# Patient Record
Sex: FEMALE | Race: WHITE | Employment: FULL TIME | URBAN - METROPOLITAN AREA
[De-identification: names, ages, dates, MRNs, and addresses within clinical notes are randomized per-mention and may not be internally consistent; named-entity substitution may affect disease eponyms.]

---

## 2017-07-19 ENCOUNTER — OFFICE VISIT (OUTPATIENT)
Dept: ORTHOPEDIC SURGERY | Age: 32
End: 2017-07-19

## 2017-07-19 VITALS — BODY MASS INDEX: 21.82 KG/M2 | HEIGHT: 68 IN | WEIGHT: 143.96 LBS

## 2017-07-19 DIAGNOSIS — M25.522 LEFT ELBOW PAIN: Primary | ICD-10-CM

## 2017-07-19 DIAGNOSIS — M77.02 MEDIAL EPICONDYLITIS, LEFT: ICD-10-CM

## 2017-07-19 PROCEDURE — 73080 X-RAY EXAM OF ELBOW: CPT | Performed by: ORTHOPAEDIC SURGERY

## 2017-07-19 PROCEDURE — 99213 OFFICE O/P EST LOW 20 MIN: CPT | Performed by: ORTHOPAEDIC SURGERY

## 2018-08-30 LAB
C. TRACHOMATIS, EXTERNAL RESULT: NEGATIVE
N. GONORRHOEAE, EXTERNAL RESULT: NEGATIVE

## 2018-09-27 LAB
ABO, EXTERNAL RESULT: NORMAL
HEP B, EXTERNAL RESULT: NEGATIVE
HIV, EXTERNAL RESULT: NEGATIVE
RHOGAM, EXTERNAL RESULT: POSITIVE
RPR, EXTERNAL RESULT: NORMAL
RUBELLA TITER, EXTERNAL RESULT: NORMAL

## 2018-10-26 ENCOUNTER — OFFICE VISIT (OUTPATIENT)
Dept: ORTHOPEDIC SURGERY | Age: 33
End: 2018-10-26
Payer: COMMERCIAL

## 2018-10-26 VITALS
SYSTOLIC BLOOD PRESSURE: 114 MMHG | WEIGHT: 166 LBS | HEART RATE: 108 BPM | HEIGHT: 68 IN | DIASTOLIC BLOOD PRESSURE: 74 MMHG | BODY MASS INDEX: 25.16 KG/M2

## 2018-10-26 DIAGNOSIS — S93.402A SPRAIN OF LEFT ANKLE, UNSPECIFIED LIGAMENT, INITIAL ENCOUNTER: Primary | ICD-10-CM

## 2018-10-26 PROBLEM — Z30.430 ENCOUNTER FOR INSERTION OF INTRAUTERINE CONTRACEPTIVE DEVICE: Status: ACTIVE | Noted: 2018-10-26

## 2018-10-26 PROCEDURE — 99213 OFFICE O/P EST LOW 20 MIN: CPT | Performed by: ORTHOPAEDIC SURGERY

## 2018-10-26 PROCEDURE — L1902 AFO ANKLE GAUNTLET PRE OTS: HCPCS | Performed by: ORTHOPAEDIC SURGERY

## 2018-10-26 NOTE — PROGRESS NOTES
warm and dry. There is no swelling, warmth, or erythema. Range of motion is within normal limits. There is no paraspinal or spinous process tenderness. Ipsilateral and contralateral straight leg raising tests are negative. The distal neurovascular exam is grossly intact and symmetric. Left ankle sprain    Assessment :  Left ankle sprain    Impression:  Encounter Diagnosis   Name Primary?  Sprain of left ankle, unspecified ligament, initial encounter Yes       Office Procedures:  Orders Placed This Encounter   Procedures    Govind Ankle Brace     Patient was prescribed a Govind Ankle Brace. The left ankle will require stabilization / immobilization from this semi-rigid / rigid orthosis to improve their function. The orthosis will assist in protecting the affected area, provide functional support and facilitate healing. Patient was instructed to progress ambulation weight bearing as tolerated in the device. The patient was educated and fit by a healthcare professional with expert knowledge and specialization in brace application while under the direct supervision of the treating physician. Verbal and written instructions for the use of and application of this item were provided. They were instructed to contact the office immediately should the brace result in increased pain, decreased sensation, increased swelling or worsening of the condition. Procedures    Govind Ankle Brace     Patient was prescribed a Govind Ankle Brace. The left ankle will require stabilization / immobilization from this semi-rigid / rigid orthosis to improve their function. The orthosis will assist in protecting the affected area, provide functional support and facilitate healing. Patient was instructed to progress ambulation weight bearing as tolerated in the device.      The patient was educated and fit by a healthcare professional with expert knowledge and specialization in brace application while under the

## 2019-03-26 LAB — GBS, EXTERNAL RESULT: NEGATIVE

## 2019-04-24 ENCOUNTER — HOSPITAL ENCOUNTER (INPATIENT)
Age: 34
LOS: 3 days | Discharge: HOME | End: 2019-04-27
Attending: OBSTETRICS & GYNECOLOGY | Admitting: OBSTETRICS & GYNECOLOGY
Payer: COMMERCIAL

## 2019-04-24 ENCOUNTER — APPOINTMENT (OUTPATIENT)
Dept: LABOR AND DELIVERY | Age: 34
End: 2019-04-24
Payer: COMMERCIAL

## 2019-04-24 LAB
AMPHETAMINE SCREEN, URINE: NORMAL
BARBITURATE SCREEN URINE: NORMAL
BASOPHILS ABSOLUTE: 0 K/UL (ref 0–0.2)
BASOPHILS RELATIVE PERCENT: 0.2 %
BENZODIAZEPINE SCREEN, URINE: NORMAL
BUPRENORPHINE URINE: NORMAL
CANNABINOID SCREEN URINE: NORMAL
COCAINE METABOLITE SCREEN URINE: NORMAL
EOSINOPHILS ABSOLUTE: 0.1 K/UL (ref 0–0.6)
EOSINOPHILS RELATIVE PERCENT: 1 %
HCT VFR BLD CALC: 37.6 % (ref 36–48)
HEMOGLOBIN: 13.3 G/DL (ref 12–16)
LYMPHOCYTES ABSOLUTE: 2.3 K/UL (ref 1–5.1)
LYMPHOCYTES RELATIVE PERCENT: 23.9 %
Lab: NORMAL
MCH RBC QN AUTO: 36 PG (ref 26–34)
MCHC RBC AUTO-ENTMCNC: 35.4 G/DL (ref 31–36)
MCV RBC AUTO: 101.6 FL (ref 80–100)
METHADONE SCREEN, URINE: NORMAL
MONOCYTES ABSOLUTE: 0.4 K/UL (ref 0–1.3)
MONOCYTES RELATIVE PERCENT: 4.4 %
NEUTROPHILS ABSOLUTE: 7 K/UL (ref 1.7–7.7)
NEUTROPHILS RELATIVE PERCENT: 70.5 %
OPIATE SCREEN URINE: NORMAL
OXYCODONE URINE: NORMAL
PDW BLD-RTO: 13.1 % (ref 12.4–15.4)
PH UA: 6.5
PHENCYCLIDINE SCREEN URINE: NORMAL
PLATELET # BLD: 268 K/UL (ref 135–450)
PMV BLD AUTO: 7.5 FL (ref 5–10.5)
PROPOXYPHENE SCREEN: NORMAL
RBC # BLD: 3.69 M/UL (ref 4–5.2)
SPECIMEN STATUS: NORMAL
WBC # BLD: 9.9 K/UL (ref 4–11)

## 2019-04-24 PROCEDURE — 86780 TREPONEMA PALLIDUM: CPT

## 2019-04-24 PROCEDURE — 3E0P7VZ INTRODUCTION OF HORMONE INTO FEMALE REPRODUCTIVE, VIA NATURAL OR ARTIFICIAL OPENING: ICD-10-PCS | Performed by: OBSTETRICS & GYNECOLOGY

## 2019-04-24 PROCEDURE — 2580000003 HC RX 258

## 2019-04-24 PROCEDURE — 1220000000 HC SEMI PRIVATE OB R&B

## 2019-04-24 PROCEDURE — 6370000000 HC RX 637 (ALT 250 FOR IP): Performed by: ADVANCED PRACTICE MIDWIFE

## 2019-04-24 PROCEDURE — 85025 COMPLETE CBC W/AUTO DIFF WBC: CPT

## 2019-04-24 PROCEDURE — 80307 DRUG TEST PRSMV CHEM ANLYZR: CPT

## 2019-04-24 RX ORDER — MULTIVIT WITH MINERALS/LUTEIN
250 TABLET ORAL DAILY
COMMUNITY

## 2019-04-24 RX ORDER — ACETAMINOPHEN 500 MG
500 TABLET ORAL EVERY 6 HOURS PRN
COMMUNITY

## 2019-04-24 RX ORDER — SODIUM CHLORIDE 0.9 % (FLUSH) 0.9 %
10 SYRINGE (ML) INJECTION EVERY 12 HOURS SCHEDULED
Status: DISCONTINUED | OUTPATIENT
Start: 2019-04-24 | End: 2019-04-25

## 2019-04-24 RX ORDER — SODIUM CHLORIDE, SODIUM LACTATE, POTASSIUM CHLORIDE, CALCIUM CHLORIDE 600; 310; 30; 20 MG/100ML; MG/100ML; MG/100ML; MG/100ML
INJECTION, SOLUTION INTRAVENOUS
Status: COMPLETED
Start: 2019-04-24 | End: 2019-04-24

## 2019-04-24 RX ORDER — MAGNESIUM 30 MG
30 TABLET ORAL 2 TIMES DAILY
COMMUNITY
End: 2020-02-06 | Stop reason: ALTCHOICE

## 2019-04-24 RX ORDER — SODIUM CHLORIDE, SODIUM LACTATE, POTASSIUM CHLORIDE, CALCIUM CHLORIDE 600; 310; 30; 20 MG/100ML; MG/100ML; MG/100ML; MG/100ML
INJECTION, SOLUTION INTRAVENOUS CONTINUOUS
Status: DISCONTINUED | OUTPATIENT
Start: 2019-04-24 | End: 2019-04-25

## 2019-04-24 RX ORDER — ONDANSETRON 2 MG/ML
4 INJECTION INTRAMUSCULAR; INTRAVENOUS EVERY 6 HOURS PRN
Status: DISCONTINUED | OUTPATIENT
Start: 2019-04-24 | End: 2019-04-25

## 2019-04-24 RX ORDER — DOCUSATE SODIUM 100 MG/1
100 CAPSULE, LIQUID FILLED ORAL 2 TIMES DAILY
Status: DISCONTINUED | OUTPATIENT
Start: 2019-04-24 | End: 2019-04-27 | Stop reason: HOSPADM

## 2019-04-24 RX ORDER — SODIUM CHLORIDE 0.9 % (FLUSH) 0.9 %
10 SYRINGE (ML) INJECTION PRN
Status: DISCONTINUED | OUTPATIENT
Start: 2019-04-24 | End: 2019-04-25

## 2019-04-24 RX ADMIN — DINOPROSTONE 10 MG: 10 INSERT VAGINAL at 20:13

## 2019-04-24 RX ADMIN — SODIUM CHLORIDE, POTASSIUM CHLORIDE, SODIUM LACTATE AND CALCIUM CHLORIDE: 600; 310; 30; 20 INJECTION, SOLUTION INTRAVENOUS at 19:45

## 2019-04-25 ENCOUNTER — ANESTHESIA (OUTPATIENT)
Dept: LABOR AND DELIVERY | Age: 34
End: 2019-04-25
Payer: COMMERCIAL

## 2019-04-25 ENCOUNTER — ANESTHESIA EVENT (OUTPATIENT)
Dept: LABOR AND DELIVERY | Age: 34
End: 2019-04-25
Payer: COMMERCIAL

## 2019-04-25 PROBLEM — S93.402A SPRAIN OF LEFT ANKLE: Status: RESOLVED | Noted: 2018-10-26 | Resolved: 2019-04-25

## 2019-04-25 PROBLEM — Z30.430 ENCOUNTER FOR INSERTION OF INTRAUTERINE CONTRACEPTIVE DEVICE: Status: RESOLVED | Noted: 2018-10-26 | Resolved: 2019-04-25

## 2019-04-25 LAB — TOTAL SYPHILLIS IGG/IGM: NORMAL

## 2019-04-25 PROCEDURE — 7200000001 HC VAGINAL DELIVERY

## 2019-04-25 PROCEDURE — 51701 INSERT BLADDER CATHETER: CPT

## 2019-04-25 PROCEDURE — 10907ZC DRAINAGE OF AMNIOTIC FLUID, THERAPEUTIC FROM PRODUCTS OF CONCEPTION, VIA NATURAL OR ARTIFICIAL OPENING: ICD-10-PCS | Performed by: OBSTETRICS & GYNECOLOGY

## 2019-04-25 PROCEDURE — 2580000003 HC RX 258: Performed by: ADVANCED PRACTICE MIDWIFE

## 2019-04-25 PROCEDURE — 3E033VJ INTRODUCTION OF OTHER HORMONE INTO PERIPHERAL VEIN, PERCUTANEOUS APPROACH: ICD-10-PCS | Performed by: OBSTETRICS & GYNECOLOGY

## 2019-04-25 PROCEDURE — 6360000002 HC RX W HCPCS: Performed by: ADVANCED PRACTICE MIDWIFE

## 2019-04-25 PROCEDURE — 2500000003 HC RX 250 WO HCPCS: Performed by: NURSE ANESTHETIST, CERTIFIED REGISTERED

## 2019-04-25 PROCEDURE — 6370000000 HC RX 637 (ALT 250 FOR IP): Performed by: ADVANCED PRACTICE MIDWIFE

## 2019-04-25 PROCEDURE — 1220000000 HC SEMI PRIVATE OB R&B

## 2019-04-25 PROCEDURE — 3700000025 EPIDURAL BLOCK: Performed by: ANESTHESIOLOGY

## 2019-04-25 RX ORDER — BUPIVACAINE HYDROCHLORIDE 2.5 MG/ML
INJECTION, SOLUTION EPIDURAL; INFILTRATION; INTRACAUDAL PRN
Status: DISCONTINUED | OUTPATIENT
Start: 2019-04-25 | End: 2019-04-25 | Stop reason: SDUPTHER

## 2019-04-25 RX ORDER — IBUPROFEN 600 MG/1
600 TABLET ORAL EVERY 6 HOURS PRN
Status: DISCONTINUED | OUTPATIENT
Start: 2019-04-25 | End: 2019-04-27 | Stop reason: HOSPADM

## 2019-04-25 RX ADMIN — Medication 15 ML/HR: at 17:50

## 2019-04-25 RX ADMIN — BUPIVACAINE HYDROCHLORIDE 5 ML: 2.5 INJECTION, SOLUTION EPIDURAL; INFILTRATION; INTRACAUDAL; PERINEURAL at 17:43

## 2019-04-25 RX ADMIN — IBUPROFEN 600 MG: 600 TABLET, FILM COATED ORAL at 22:54

## 2019-04-25 RX ADMIN — SODIUM CHLORIDE, POTASSIUM CHLORIDE, SODIUM LACTATE AND CALCIUM CHLORIDE: 600; 310; 30; 20 INJECTION, SOLUTION INTRAVENOUS at 09:15

## 2019-04-25 RX ADMIN — ONDANSETRON 4 MG: 2 INJECTION INTRAMUSCULAR; INTRAVENOUS at 18:13

## 2019-04-25 RX ADMIN — BUPIVACAINE HYDROCHLORIDE 7 ML: 2.5 INJECTION, SOLUTION EPIDURAL; INFILTRATION; INTRACAUDAL; PERINEURAL at 17:34

## 2019-04-25 RX ADMIN — SODIUM CHLORIDE, POTASSIUM CHLORIDE, SODIUM LACTATE AND CALCIUM CHLORIDE: 600; 310; 30; 20 INJECTION, SOLUTION INTRAVENOUS at 17:25

## 2019-04-25 RX ADMIN — WITCH HAZEL 40 EACH: 500 SOLUTION RECTAL; TOPICAL at 22:56

## 2019-04-25 RX ADMIN — Medication 1 MILLI-UNITS/MIN: at 09:30

## 2019-04-25 ASSESSMENT — LIFESTYLE VARIABLES: SMOKING_STATUS: 1

## 2019-04-25 ASSESSMENT — PAIN SCALES - GENERAL: PAINLEVEL_OUTOF10: 0

## 2019-04-25 NOTE — ANESTHESIA PRE PROCEDURE
mL/hr at 04/25/19 1750    bupivacaine (PF) (MARCAINE) 0.25 % injection    PRN DANIELITO Hyman - CRNA   5 mL at 04/25/19 1743       Allergies: Allergies   Allergen Reactions    No Known Allergies      10/24/2018 -     04/20/2015 -         Problem List:    Patient Active Problem List   Diagnosis Code    Threatened labor at term O47.9    Spontaneous vaginal delivery O80    Plantar fasciitis, left M72.2    Encounter for insertion of intrauterine contraceptive device Z30.430    Sprain of left ankle S93.402A    Labor and delivery indication for care or intervention O75.9       Past Medical History:  History reviewed. No pertinent past medical history. Past Surgical History:        Procedure Laterality Date    ANKLE SURGERY      right ankle    DENTAL SURGERY      wisdom teeth    TYMPANOSTOMY TUBE PLACEMENT      x2       Social History:    Social History     Tobacco Use    Smoking status: Current Every Day Smoker     Packs/day: 0.25    Smokeless tobacco: Never Used   Substance Use Topics    Alcohol use: No     Alcohol/week: 0.0 oz                                Ready to quit: No  Counseling given: No      Vital Signs (Current):   Vitals:    04/25/19 1739 04/25/19 1742 04/25/19 1745 04/25/19 1748   BP: 122/74 128/72 (!) 140/70 122/70   Pulse: 76 72 85 77   Resp:       Temp:       TempSrc:       Weight:       Height:                                                  BP Readings from Last 3 Encounters:   04/25/19 122/70   10/26/18 114/74   12/02/15 115/75       NPO Status: Time of last liquid consumption: 1957                        Time of last solid consumption: 1430                        Date of last liquid consumption: 04/24/19                        Date of last solid food consumption: 04/24/19    BMI:   Wt Readings from Last 3 Encounters:   04/24/19 184 lb (83.5 kg)   10/26/18 166 lb (75.3 kg)   07/19/17 143 lb 15.4 oz (65.3 kg)     Body mass index is 27.98 kg/m².     CBC:   Lab Results Component Value Date    WBC 9.9 04/24/2019    RBC 3.69 04/24/2019    HGB 13.3 04/24/2019    HCT 37.6 04/24/2019    .6 04/24/2019    RDW 13.1 04/24/2019     04/24/2019       CMP: No results found for: NA, K, CL, CO2, BUN, CREATININE, GFRAA, AGRATIO, LABGLOM, GLUCOSE, PROT, CALCIUM, BILITOT, ALKPHOS, AST, ALT    POC Tests: No results for input(s): POCGLU, POCNA, POCK, POCCL, POCBUN, POCHEMO, POCHCT in the last 72 hours. Coags: No results found for: PROTIME, INR, APTT    HCG (If Applicable):   Lab Results   Component Value Date    PREGTESTUR Neg 11/30/2015        ABGs: No results found for: PHART, PO2ART, VYK7ALG, NVU0FLL, BEART, O3VOHXRK     Type & Screen (If Applicable):  No results found for: LABABO, LABRH    Anesthesia Evaluation   no history of anesthetic complications:   Airway: Mallampati: II  TM distance: >3 FB   Neck ROM: full  Mouth opening: > = 3 FB Dental: normal exam         Pulmonary:normal exam    (+) current smoker                           Cardiovascular:Negative CV ROS                      Neuro/Psych:   Negative Neuro/Psych ROS              GI/Hepatic/Renal: Neg GI/Hepatic/Renal ROS            Endo/Other: Negative Endo/Other ROS                    Abdominal:           Vascular: negative vascular ROS. Anesthesia Plan      epidural     ASA 2 - emergent             Anesthetic plan and risks discussed with patient and spouse. Plan discussed with attending.                   DANIELITO Rojas - CRNA   4/25/2019

## 2019-04-25 NOTE — PLAN OF CARE
Problem: Anxiety:  Goal: Level of anxiety will decrease  Description  Level of anxiety will decrease  Outcome: Ongoing     Problem: Breathing Pattern - Ineffective:  Goal: Able to breathe comfortably  Description  Able to breathe comfortably  Outcome: Ongoing     Problem: Fluid Volume - Imbalance:  Goal: Absence of imbalanced fluid volume signs and symptoms  Description  Absence of imbalanced fluid volume signs and symptoms  Outcome: Ongoing  Goal: Absence of intrapartum hemorrhage signs and symptoms  Description  Absence of intrapartum hemorrhage signs and symptoms  Outcome: Ongoing     Problem: Infection - Intrapartum Infection:  Goal: Will show no infection signs and symptoms  Description  Will show no infection signs and symptoms  Outcome: Ongoing     Problem: Labor Process - Prolonged:  Goal: Labor progression, first stage, within specified pattern  Description  Labor progression, first stage, within specified pattern  Outcome: Ongoing  Goal: Labor progession, second stage, within specified pattern  Description  Labor progession, second stage, within specified pattern  Outcome: Ongoing  Goal: Uterine contractions within specified parameters  Description  Uterine contractions within specified parameters  Outcome: Ongoing     Problem:  Screening:  Goal: Ability to make informed decisions regarding treatment has improved  Description  Ability to make informed decisions regarding treatment has improved  Outcome: Ongoing     Problem: Pain - Acute:  Goal: Pain level will decrease  Description  Pain level will decrease  Outcome: Ongoing  Goal: Able to cope with pain  Description  Able to cope with pain  Outcome: Ongoing     Problem: Tissue Perfusion - Uteroplacental, Altered:  Goal: Absence of abnormal fetal heart rate pattern  Description  Absence of abnormal fetal heart rate pattern  Outcome: Ongoing     Problem: Urinary Retention:  Goal: Experiences of bladder distention will decrease  Description  Experiences of bladder distention will decrease  Outcome: Ongoing  Goal: Urinary elimination within specified parameters  Description  Urinary elimination within specified parameters  Outcome: Ongoing

## 2019-04-25 NOTE — ANESTHESIA PROCEDURE NOTES
Epidural Block    Patient location during procedure: OB  Start time: 4/25/2019 5:20 PM  End time: 4/25/2019 5:50 PM  Reason for block: labor epidural  Staffing  Resident/CRNA: DANIELITO Fan - CRNA  Performed: resident/CRNA   Preanesthetic Checklist  Completed: patient identified, site marked, surgical consent, pre-op evaluation, timeout performed, IV checked, risks and benefits discussed, monitors and equipment checked, anesthesia consent given, oxygen available and patient being monitored  Epidural  Patient position: sitting  Prep: ChloraPrep  Patient monitoring: continuous pulse ox and frequent blood pressure checks  Approach: midline  Location: lumbar (1-5) (L4-5)  Injection technique: JEAN air  Provider prep: mask and sterile gloves  Needle  Needle type: Tuohy   Needle gauge: 17 G  Needle length: 3.5 in  Catheter type: side hole  Catheter size: 19 G (20 G)  Catheter at skin depth: 12 cm  Test dose: negative  Assessment  Sensory level: T6  Hemodynamics: stable  Attempts: 1  Additional Notes  Sitting, Sterile prep/drape, 1%Xylo at L4-5, 17ga Tuohy with JEAN, 25ga Pencan for w/+CSF for DPE, Pencan removed, Catheter inserted, negative test dose, sterile dressing applied. Warm

## 2019-04-25 NOTE — PROGRESS NOTES
Department of Obstetrics and Gynecology  Labor and Delivery   CNM Progress Note      SUBJECTIVE:  Comfortable with family at bedside. Pitocin begun at 0930.      OBJECTIVE:      Fetal heart rate:       Baseline Heart Rate:  130        Accelerations:  present       Long Term Variability:  moderate       Decelerations:  absent         Contraction frequency: mild, irregular    Membranes:  Intact    Cervix:         Dilation:  1 cm         Effacement:  50%         Station:  -2         Position:  posterior             ASSESSMENT & PLAN:    29 y.o.    OB History        3    Para   2    Term   2       0    AB   0    Living   2       SAB   0    TAB   0    Ectopic   0    Molar   0    Multiple   0    Live Births   2               A: Elective IOL   Expectant management   Anticipate     P: Pitocin per protocol   Epidural as labor advances   Dr. Remi Trotter aware    Lithuanian Mumtaz

## 2019-04-25 NOTE — PROGRESS NOTES
Pitocin at 18 mu/min. Contractions Q2 mins. Patient reports getting more uncomfortable. VE unchanged. AROM performed and moderate amount of clear fluid returned. FHR category 1. Plans for epidural.  and sleeping daughter at bedside.   [facility]

## 2019-04-25 NOTE — H&P
Minutes per session: Not on file    Stress: Not on file   Relationships    Social connections:     Talks on phone: Not on file     Gets together: Not on file     Attends Mu-ism service: Not on file     Active member of club or organization: Not on file     Attends meetings of clubs or organizations: Not on file     Relationship status: Not on file    Intimate partner violence:     Fear of current or ex partner: Not on file     Emotionally abused: Not on file     Physically abused: Not on file     Forced sexual activity: Not on file   Other Topics Concern    Not on file   Social History Narrative    Not on file     Family History:       Problem Relation Age of Onset    Substance Abuse Mother         alcoholic    Substance Abuse Brother 32        heroine use    High Blood Pressure Maternal Grandfather      Medications Prior to Admission:  Medications Prior to Admission: magnesium 30 MG tablet, Take 30 mg by mouth 2 times daily  Ascorbic Acid (VITAMIN C) 250 MG tablet, Take 250 mg by mouth daily  Prenatal Vit-Fe Fumarate-FA (PRENATAL 1+1 PO), Take by mouth  acetaminophen (TYLENOL) 500 MG tablet, Take 500 mg by mouth every 6 hours as needed for Pain      PHYSICAL EXAM:  Vitals:    04/24/19 1957   BP: 126/78   Pulse: 73   Resp: 16   Weight: 184 lb (83.5 kg)   Height: 5' 8\" (1.727 m)     General appearance:  awake, alert, cooperative, no apparent distress, and appears stated age  Neurologic:  Awake, alert, oriented to name, place and time. Lungs:  No increased work of breathing, good air exchange  Abdomen:  Soft, non tender, gravid, consistent with her gestational age, EFW by Leopold's maneuver was 8#   Fetal heart rate:  Reassuring. 145 moderate variability.  Accels present  Pelvis:  Adequate pelvis  Cervix: 1/50/-2  Contraction frequency:  Occasional mild contractions     Membranes:  Intact    Labs:   CBC:   Lab Results   Component Value Date    WBC 9.9 04/24/2019    RBC 3.69 04/24/2019    HGB 13.3 2019    HCT 37.6 2019    .6 2019    MCH 36.0 2019    MCHC 35.4 2019    RDW 13.1 2019     2019    MPV 7.5 2019     Urine Toxicology:  No components found for: IAMMENTA, IBARBIT, IBENZO, ICOCAINE, IMARTHC, IOPIATES, IPHENCYC    ASSESSMENT AND PLAN:    36yo  at 40.3 wks gestation of induction of labor  FHT Category 1  GBS Negative  Admit, routine orders, cervidil and pitocin induction, epidural as desires, Dr Panchito Siddiqi to be notified of pt admission  Anticipate vaginal delivery

## 2019-04-25 NOTE — PROGRESS NOTES
Resting comfortably in bed with family at bedside. Pitocin at 12 mu/min. Contractions Q2-4 mins but remain mild. VE - 2cm/70%/-2. FHR category 1. TIMOTHY Tinajeor

## 2019-04-26 LAB
BASOPHILS ABSOLUTE: 0 K/UL (ref 0–0.2)
BASOPHILS RELATIVE PERCENT: 0.2 %
EOSINOPHILS ABSOLUTE: 0.1 K/UL (ref 0–0.6)
EOSINOPHILS RELATIVE PERCENT: 0.9 %
HCT VFR BLD CALC: 35.5 % (ref 36–48)
HEMOGLOBIN: 12.4 G/DL (ref 12–16)
LYMPHOCYTES ABSOLUTE: 1.9 K/UL (ref 1–5.1)
LYMPHOCYTES RELATIVE PERCENT: 19.5 %
MCH RBC QN AUTO: 36.2 PG (ref 26–34)
MCHC RBC AUTO-ENTMCNC: 35.1 G/DL (ref 31–36)
MCV RBC AUTO: 103.3 FL (ref 80–100)
MONOCYTES ABSOLUTE: 0.4 K/UL (ref 0–1.3)
MONOCYTES RELATIVE PERCENT: 4.3 %
NEUTROPHILS ABSOLUTE: 7.2 K/UL (ref 1.7–7.7)
NEUTROPHILS RELATIVE PERCENT: 75.1 %
PDW BLD-RTO: 13.3 % (ref 12.4–15.4)
PLATELET # BLD: 261 K/UL (ref 135–450)
PMV BLD AUTO: 7.3 FL (ref 5–10.5)
RBC # BLD: 3.44 M/UL (ref 4–5.2)
WBC # BLD: 9.5 K/UL (ref 4–11)

## 2019-04-26 PROCEDURE — 6370000000 HC RX 637 (ALT 250 FOR IP): Performed by: ADVANCED PRACTICE MIDWIFE

## 2019-04-26 PROCEDURE — 1220000000 HC SEMI PRIVATE OB R&B

## 2019-04-26 PROCEDURE — 85025 COMPLETE CBC W/AUTO DIFF WBC: CPT

## 2019-04-26 PROCEDURE — 36415 COLL VENOUS BLD VENIPUNCTURE: CPT

## 2019-04-26 RX ADMIN — DOCUSATE SODIUM 100 MG: 100 CAPSULE, LIQUID FILLED ORAL at 20:30

## 2019-04-26 RX ADMIN — DOCUSATE SODIUM 100 MG: 100 CAPSULE, LIQUID FILLED ORAL at 09:00

## 2019-04-26 RX ADMIN — IBUPROFEN 600 MG: 600 TABLET, FILM COATED ORAL at 20:30

## 2019-04-26 RX ADMIN — IBUPROFEN 600 MG: 600 TABLET, FILM COATED ORAL at 13:29

## 2019-04-26 RX ADMIN — IBUPROFEN 600 MG: 600 TABLET, FILM COATED ORAL at 06:00

## 2019-04-26 ASSESSMENT — PAIN SCALES - GENERAL
PAINLEVEL_OUTOF10: 2
PAINLEVEL_OUTOF10: 2
PAINLEVEL_OUTOF10: 5

## 2019-04-26 NOTE — PROGRESS NOTES
Department of Obstetrics and Gynecology  Labor and Delivery  CNZULLY Post Partum Progress Note      SUBJECTIVE:  States feeling well. Baby bottlefeeding. Reports appetite good. Voiding without difficulty. Lochia small/mod amount. Desires baby to be circumcised. Plans to go home tomorrow if stable.      OBJECTIVE:      Vitals:  Vitals:    19 0545   BP: 111/73   Pulse: 71   Resp: 16   Temp: 97.6 °F (36.4 °C)       ABDOMEN:  soft, non-distended, non-tender, FF  PERINEUM: intact  EXT: no edema    DATA:    CBC:    Lab Results   Component Value Date    WBC 9.9 2019    HGB 13.3 2019    HCT 37.6 2019     2019       ASSESSMENT & PLAN:    29 y.o. OB History        3    Para   3    Term   3       0    AB   0    Living   3       SAB   0    TAB   0    Ectopic   0    Molar   0    Multiple   0    Live Births   3                 A:  Stable pp day 1   Stable male infant - to be circumcised    P: Continue pp care   Probable d/c home tomorrow if stable    Raghu Castillo CNM

## 2019-04-26 NOTE — PROGRESS NOTES
Patient up for the first time with the help of 2 RNs. Assist x 2 provided; patient tolerated well. Pericare performed, panties applied and Pad changed. Patient voided 500 ml without difficulty. Patient was moved to room 319 without difficulty. Patient is pink and stable.

## 2019-04-26 NOTE — L&D DELIVERY SUMMARY NOTE
[address] [address]                            LABOR AND DELIVERY NOTE    PATIENT NAME: UDAY Soler                     :        1985  MED REC NO:   [id number]                          ROOM:       2261  ACCOUNT NO:   [de-identified]                           ADMIT DATE: 2019  PROVIDER:     Supriya Romero CNM    DATE OF PROCEDURE:  2019    DELIVERY SUMMARY    The patient is a 72-year-old  3, para 2, admitted to Labor and  Delivery at 40 weeks and 3 days gestation for an elective induction of   Labor. She was given Cervidil at [facility] and it remained in place  overnight, removed and Pitocin was initiated at 0930 on 2019. She  received Pitocin throughout the day. At the maximum, it was 18  milliunits a minute. She requested and received an epidural soon after  artificial rupture of membranes occurred at 1630 with a moderate amount  of clear fluid. She continued to progress. As she was nearing  completion, she began having some late decelerations and the Pitocin  was turned off at that time at 1830. She was found to be complete at  1902 and at 1933, on 2019, she had a normal spontaneous vaginal  delivery of a liveborn male infant with Apgar scores of 8 and 9. The  baby was delivered and placed on mom's abdomen and required no  resuscitation other than bulb syringe. He established good color, tone,  respiratory effort and cry promptly. The cord was delayed in clamping  and then was doubly clamped and severed by the father. Cord blood was  obtained and then the placenta delivered spontaneously with gentle  traction. It was inspected and found to be intact. The uterus  contracted promptly with massage. The estimated blood loss was 300 mL. Upon inspection of the birth canal, the patient was found to have an  intact perineum. Mom plans to bottle feed. Baby was placed  skin-to-skin. Mother, baby, father and family entered the postpartum  period in excellent condition. Dr. Reyna Trevose notified of delivery.         Leesa Vargas CNM    D: 04/25/2019 20:00:16       T: 04/25/2019 22:36:09     [email or web address]  Job#: [id number]     Doc#: [id number]    CC:

## 2019-04-26 NOTE — PROGRESS NOTES
Patient states right leg starting to feel a little tingly, but still numb. Will continue to monitor.

## 2019-04-26 NOTE — ANESTHESIA POSTPROCEDURE EVALUATION
Department of Anesthesiology  Postprocedure Note    Patient: Ronna Booker  MRN: [id number]  YOB: 1985  Date of evaluation: 4/26/2019  Time:  6:44 AM     Procedure Summary     Date:  04/25/19 Room / Location:      Anesthesia Start:  1720 Anesthesia Stop:  1933    Procedure:  ANESTHESIA LABOR ANALGESIA Diagnosis:      Scheduled Providers:   Responsible Provider:  Neelam Virk MD    Anesthesia Type:  epidural ASA Status:  2 - Emergent          Anesthesia Type: No value filed. Gopi Phase I: Gopi Score: 10    Gopi Phase II: Gopi Score: 10    Last vitals: Reviewed and per EMR flowsheets. Anesthesia Post Evaluation    Level of consciousness: awake and alert  Complications: no  Cardiovascular status: hemodynamically stable  Respiratory status: acceptable  Comments: No apparent complications from neuraxial anesthesia.

## 2019-04-27 VITALS
HEART RATE: 72 BPM | WEIGHT: 184 LBS | TEMPERATURE: 98.2 F | SYSTOLIC BLOOD PRESSURE: 114 MMHG | DIASTOLIC BLOOD PRESSURE: 74 MMHG | HEIGHT: 68 IN | RESPIRATION RATE: 16 BRPM | BODY MASS INDEX: 27.89 KG/M2

## 2019-04-27 PROCEDURE — 6370000000 HC RX 637 (ALT 250 FOR IP): Performed by: ADVANCED PRACTICE MIDWIFE

## 2019-04-27 RX ADMIN — IBUPROFEN 600 MG: 600 TABLET, FILM COATED ORAL at 04:29

## 2019-04-27 RX ADMIN — IBUPROFEN 600 MG: 600 TABLET, FILM COATED ORAL at 12:24

## 2019-04-27 RX ADMIN — DOCUSATE SODIUM 100 MG: 100 CAPSULE, LIQUID FILLED ORAL at 08:53

## 2019-04-27 ASSESSMENT — PAIN SCALES - GENERAL
PAINLEVEL_OUTOF10: 2
PAINLEVEL_OUTOF10: 3

## 2019-04-27 NOTE — FLOWSHEET NOTE
Discharge Phone Call Log  Patient Name: Socorro Elkins     [facility] Care Provider: Mahad Felix MD Discharge Date: 2019    Disposition of baby:    Phone Number: [phone] (home)     Attempts to Contact:  Date:    Nurse  Date:    Nurse  Date:    Nurse    Introduce yourself and explain the questionnaire. 1.  Now that you are at home is your pain being well controlled? Y/N   What pain reducing measures are you using? ____________________________________        Information for the patient's :  Ting Hernandez [[id number]]   Delivery Method: Vaginal, Spontaneous    2. Are you having any infant feeding issues? Y/N _____________________________      If breastfeeding, were you satisfied with the breastfeeding support services offered? Y/N  3. Any engorgement problems? Y/N  Have you had to supplement? Y/N  4. Have you made or have you already had your first appointment with the baby's doctor? Y/N If no, do you know when to schedule it? Y/N Do you have a safe crib, bassinet or play yard with a firm mattress for your infant to sleep in at home? Y/N  5. Have you scheduled your follow-up appointment? Y/N  If no, do you know when to schedule it? Y/N  6. Did your nurses and physicians include you in the plan of care, communicating with      you respectfully, and in a manner easy to understand? Y/N       Comments:  ___________________________________________________________  7. Is there anyone in particular you would like to mention who provided care for you?          ____________________________    8. Do you have any questions about your discharge instructions or the notebook? Y/N    9. Did your discharge occur in a timely manner? Y/N        Comments: __________________________________________________________    Remember to describe the hospital survey and ask patient to return it when possible.   Questions During Interview:__________________________________________________________  Teaching During interview :___________________________________________________________  ___________________________RN       Date:______________Time:________________

## 2019-04-27 NOTE — DISCHARGE SUMMARY
Obstetrical Discharge Form    Gestational Age: 36w2d    Antepartum complications: none    Date of Delivery: 2019      Type of Delivery: vaginal, spontaneous    Delivered By: Radhika Michael     Baby:      Information for the patient's :  Claus Carrasco [[id number]]   APGAR One: 8    Information for the patient's :  Claus Carrasco [[id number]]   APGAR Five: 9    Information for the patient's :  Claus Carrasco [[id number]]   Birth Weight: 9 lb 3.7 oz (4.186 kg)      Anesthesia: Epidural    Intrapartum complications: None    Postpartum complications: none    Discharge Medication:    Joey Martins   Pool Medication Instructions [id number]    Printed on:19 0853   Medication Information                      acetaminophen (TYLENOL) 500 MG tablet  Take 500 mg by mouth every 6 hours as needed for Pain             Ascorbic Acid (VITAMIN C) 250 MG tablet  Take 250 mg by mouth daily             magnesium 30 MG tablet  Take 30 mg by mouth 2 times daily             Prenatal Vit-Fe Fumarate-FA (PRENATAL 1+1 PO)  Take by mouth                  Discharge Condition:  good    Discharge Date: 19    PLAN:  Follow up in 6 weeks for routine PP visit  All questions answered  D/C summary begun at delivery for D/C planning purposes, any delay in discharge from ordered D/C date due to  factors.

## 2019-04-27 NOTE — PROGRESS NOTES
Department of Obstetrics and Gynecology  Labor and Delivery  Attending Post Partum Progress Note      SUBJECTIVE:  Feeling well, ambulating w/out problems. Voiding and bleeding WNL. Denies any problems w/pain. Bottle-feeding infant. OBJECTIVE:      Vitals:  /82   Pulse 66   Temp 97.7 °F (36.5 °C) (Oral)   Resp 16   Ht 5' 8\" (1.727 m)   Wt 184 lb (83.5 kg)   Breastfeeding?  Unknown   BMI 27.98 kg/m²         DATA:    CBC:    Lab Results   Component Value Date    WBC 9.5 04/26/2019    RBC 3.44 04/26/2019    HGB 12.4 04/26/2019    HCT 35.5 04/26/2019    .3 04/26/2019    RDW 13.3 04/26/2019     04/26/2019       ASSESSMENT & PLAN:      Normal PP Day 2  Stable infant male    D/C instructions and warnings given  Office F/U in 6wks  D/C home today

## 2019-04-27 NOTE — PLAN OF CARE
Problem: Fluid Volume - Imbalance:  Goal: Absence of postpartum hemorrhage signs and symptoms  Description  Absence of postpartum hemorrhage signs and symptoms  4/27/2019 0942 by Dominga Dsouza RN  Outcome: Completed  4/27/2019 0643 by Maddison Bingham RN  Outcome: Ongoing     Problem: Discharge Planning:  Goal: Discharged to appropriate level of care  Description  Discharged to appropriate level of care  4/27/2019 0942 by Dominga Dsouza RN  Outcome: Completed  4/27/2019 0643 by Maddison Bingham RN  Outcome: Ongoing     Problem: Constipation:  Goal: Bowel elimination is within specified parameters  Description  Bowel elimination is within specified parameters  4/27/2019 0942 by Dominga Dsouza RN  Outcome: Completed  4/27/2019 0643 by Maddison Bingham RN  Outcome: Ongoing     Problem: Mood - Altered:  Goal: Mood stable  Description  Mood stable  4/27/2019 0942 by Dominga Dsouza RN  Outcome: Completed  4/27/2019 0643 by Maddison Bingham RN  Outcome: Ongoing

## 2019-08-29 ENCOUNTER — HOSPITAL ENCOUNTER (OUTPATIENT)
Dept: NUCLEAR MEDICINE | Age: 34
Discharge: HOME | End: 2019-08-29
Payer: COMMERCIAL

## 2019-08-29 DIAGNOSIS — C50.412 MALIGNANT NEOPLASM OF UPPER-OUTER QUADRANT OF LEFT BREAST IN FEMALE, ESTROGEN RECEPTOR POSITIVE (HCC): ICD-10-CM

## 2019-08-29 DIAGNOSIS — Z17.0 MALIGNANT NEOPLASM OF UPPER-OUTER QUADRANT OF LEFT BREAST IN FEMALE, ESTROGEN RECEPTOR POSITIVE (HCC): ICD-10-CM

## 2019-08-29 PROCEDURE — A9503 TC99M MEDRONATE: HCPCS | Performed by: INTERNAL MEDICINE

## 2019-08-29 PROCEDURE — 3430000000 HC RX DIAGNOSTIC RADIOPHARMACEUTICAL: Performed by: INTERNAL MEDICINE

## 2019-08-29 PROCEDURE — 78306 BONE IMAGING WHOLE BODY: CPT

## 2019-08-29 RX ORDER — TC 99M MEDRONATE 20 MG/10ML
26.5 INJECTION, POWDER, LYOPHILIZED, FOR SOLUTION INTRAVENOUS ONCE
Status: COMPLETED | OUTPATIENT
Start: 2019-08-29 | End: 2019-08-29

## 2019-08-29 RX ADMIN — TC 99M MEDRONATE 26.5 MILLICURIE: 20 INJECTION, POWDER, LYOPHILIZED, FOR SOLUTION INTRAVENOUS at 07:55

## 2019-08-30 ENCOUNTER — HOSPITAL ENCOUNTER (OUTPATIENT)
Dept: NON INVASIVE DIAGNOSTICS | Age: 34
Discharge: HOME | End: 2019-08-30
Payer: COMMERCIAL

## 2019-08-30 LAB
LV EF: 58 %
LVEF MODALITY: NORMAL

## 2019-08-30 PROCEDURE — 93306 TTE W/DOPPLER COMPLETE: CPT

## 2019-09-03 ENCOUNTER — HOSPITAL ENCOUNTER (OUTPATIENT)
Dept: CT IMAGING | Age: 34
Discharge: HOME | End: 2019-09-03
Payer: COMMERCIAL

## 2019-09-03 ENCOUNTER — HOSPITAL ENCOUNTER (OUTPATIENT)
Dept: NON INVASIVE DIAGNOSTICS | Age: 34
End: 2019-09-03
Payer: COMMERCIAL

## 2019-09-03 DIAGNOSIS — Z17.0 MALIGNANT NEOPLASM OF UPPER-OUTER QUADRANT OF LEFT BREAST IN FEMALE, ESTROGEN RECEPTOR POSITIVE (HCC): ICD-10-CM

## 2019-09-03 DIAGNOSIS — C50.412 MALIGNANT NEOPLASM OF UPPER-OUTER QUADRANT OF LEFT BREAST IN FEMALE, ESTROGEN RECEPTOR POSITIVE (HCC): ICD-10-CM

## 2019-09-03 PROCEDURE — 71260 CT THORAX DX C+: CPT

## 2019-09-03 PROCEDURE — 6360000004 HC RX CONTRAST MEDICATION: Performed by: INTERNAL MEDICINE

## 2019-09-03 RX ADMIN — IOPAMIDOL 75 ML: 755 INJECTION, SOLUTION INTRAVENOUS at 13:02

## 2019-09-09 ENCOUNTER — HOSPITAL ENCOUNTER (OUTPATIENT)
Dept: MRI IMAGING | Age: 34
Discharge: HOME | End: 2019-09-09
Payer: COMMERCIAL

## 2019-09-09 DIAGNOSIS — Z17.0 MALIGNANT NEOPLASM OF UPPER-OUTER QUADRANT OF LEFT BREAST IN FEMALE, ESTROGEN RECEPTOR POSITIVE (HCC): ICD-10-CM

## 2019-09-09 DIAGNOSIS — C50.412 MALIGNANT NEOPLASM OF UPPER-OUTER QUADRANT OF LEFT BREAST IN FEMALE, ESTROGEN RECEPTOR POSITIVE (HCC): ICD-10-CM

## 2019-09-09 PROCEDURE — A9579 GAD-BASE MR CONTRAST NOS,1ML: HCPCS | Performed by: INTERNAL MEDICINE

## 2019-09-09 PROCEDURE — 6360000004 HC RX CONTRAST MEDICATION: Performed by: INTERNAL MEDICINE

## 2019-09-09 PROCEDURE — 72157 MRI CHEST SPINE W/O & W/DYE: CPT

## 2019-09-09 RX ADMIN — GADOTERIDOL 14 ML: 279.3 INJECTION, SOLUTION INTRAVENOUS at 12:51

## 2019-09-18 ENCOUNTER — HOSPITAL ENCOUNTER (OUTPATIENT)
Dept: CT IMAGING | Age: 34
Discharge: HOME | End: 2019-09-18
Payer: COMMERCIAL

## 2019-09-18 VITALS
RESPIRATION RATE: 16 BRPM | OXYGEN SATURATION: 97 % | DIASTOLIC BLOOD PRESSURE: 67 MMHG | HEART RATE: 85 BPM | TEMPERATURE: 97.5 F | SYSTOLIC BLOOD PRESSURE: 109 MMHG | BODY MASS INDEX: 23.95 KG/M2 | WEIGHT: 158 LBS | HEIGHT: 68 IN

## 2019-09-18 DIAGNOSIS — C79.51 BONE METASTASIS (HCC): ICD-10-CM

## 2019-09-18 LAB
HCT VFR BLD CALC: 42.6 % (ref 36–48)
HEMOGLOBIN: 14.6 G/DL (ref 12–16)
INR BLD: 0.95 (ref 0.86–1.14)
MCH RBC QN AUTO: 35.1 PG (ref 26–34)
MCHC RBC AUTO-ENTMCNC: 34.2 G/DL (ref 31–36)
MCV RBC AUTO: 102.6 FL (ref 80–100)
PDW BLD-RTO: 11.9 % (ref 12.4–15.4)
PLATELET # BLD: 254 K/UL (ref 135–450)
PMV BLD AUTO: 6.6 FL (ref 5–10.5)
PREGNANCY, URINE: NEGATIVE
PROTHROMBIN TIME: 10.8 SEC (ref 9.8–13)
RBC # BLD: 4.16 M/UL (ref 4–5.2)
WBC # BLD: 16.6 K/UL (ref 4–11)

## 2019-09-18 PROCEDURE — 85610 PROTHROMBIN TIME: CPT

## 2019-09-18 PROCEDURE — 88342 IMHCHEM/IMCYTCHM 1ST ANTB: CPT

## 2019-09-18 PROCEDURE — 88307 TISSUE EXAM BY PATHOLOGIST: CPT

## 2019-09-18 PROCEDURE — 88311 DECALCIFY TISSUE: CPT

## 2019-09-18 PROCEDURE — 7100000011 HC PHASE II RECOVERY - ADDTL 15 MIN

## 2019-09-18 PROCEDURE — 6360000002 HC RX W HCPCS: Performed by: RADIOLOGY

## 2019-09-18 PROCEDURE — 2709999900 CT BIOPSY DEEP BONE PERCUTANEOUS

## 2019-09-18 PROCEDURE — 77012 CT SCAN FOR NEEDLE BIOPSY: CPT

## 2019-09-18 PROCEDURE — 84703 CHORIONIC GONADOTROPIN ASSAY: CPT

## 2019-09-18 PROCEDURE — 85027 COMPLETE CBC AUTOMATED: CPT

## 2019-09-18 PROCEDURE — 7100000010 HC PHASE II RECOVERY - FIRST 15 MIN

## 2019-09-18 PROCEDURE — 6370000000 HC RX 637 (ALT 250 FOR IP): Performed by: RADIOLOGY

## 2019-09-18 RX ORDER — IBUPROFEN 200 MG
400 TABLET ORAL EVERY 6 HOURS PRN
COMMUNITY
End: 2020-02-06 | Stop reason: ALTCHOICE

## 2019-09-18 RX ORDER — MIDAZOLAM HYDROCHLORIDE 5 MG/ML
INJECTION INTRAMUSCULAR; INTRAVENOUS
Status: COMPLETED | OUTPATIENT
Start: 2019-09-18 | End: 2019-09-18

## 2019-09-18 RX ORDER — ACETAMINOPHEN 325 MG/1
650 TABLET ORAL EVERY 4 HOURS PRN
Status: DISCONTINUED | OUTPATIENT
Start: 2019-09-18 | End: 2019-09-19 | Stop reason: HOSPADM

## 2019-09-18 RX ORDER — FENTANYL CITRATE 50 UG/ML
INJECTION, SOLUTION INTRAMUSCULAR; INTRAVENOUS
Status: COMPLETED | OUTPATIENT
Start: 2019-09-18 | End: 2019-09-18

## 2019-09-18 RX ORDER — M-VIT,TX,IRON,MINS/CALC/FOLIC 27MG-0.4MG
1 TABLET ORAL DAILY
COMMUNITY

## 2019-09-18 RX ADMIN — MIDAZOLAM HYDROCHLORIDE 1 MG: 5 INJECTION, SOLUTION INTRAMUSCULAR; INTRAVENOUS at 09:04

## 2019-09-18 RX ADMIN — MIDAZOLAM HYDROCHLORIDE 1 MG: 5 INJECTION, SOLUTION INTRAMUSCULAR; INTRAVENOUS at 09:17

## 2019-09-18 RX ADMIN — MIDAZOLAM HYDROCHLORIDE 1 MG: 5 INJECTION, SOLUTION INTRAMUSCULAR; INTRAVENOUS at 09:08

## 2019-09-18 RX ADMIN — Medication 50 MCG: at 09:08

## 2019-09-18 RX ADMIN — ACETAMINOPHEN 650 MG: 325 TABLET ORAL at 10:02

## 2019-09-18 RX ADMIN — Medication 50 MCG: at 09:17

## 2019-09-18 RX ADMIN — Medication 50 MCG: at 09:04

## 2019-09-18 ASSESSMENT — PAIN SCALES - GENERAL
PAINLEVEL_OUTOF10: 0
PAINLEVEL_OUTOF10: 2
PAINLEVEL_OUTOF10: 0
PAINLEVEL_OUTOF10: 2
PAINLEVEL_OUTOF10: 3

## 2019-09-18 ASSESSMENT — PAIN - FUNCTIONAL ASSESSMENT: PAIN_FUNCTIONAL_ASSESSMENT: 0-10

## 2019-09-18 NOTE — PRE SEDATION
Sedation Pre-Procedure Note    Patient Name: Jeri Ambrose   YOB: 1985  Room/Bed: Room/bed info not found  Medical Record Number: [id number]  Date: 9/18/2019   Time: 8:48 AM       Indication:  Possible metastatic thoracic lesions for bone biopsy    Consent: I have discussed with the patient and/or the patient representative the indication, alternatives, and the possible risks and/or complications of the planned procedure and the anesthesia methods. The patient and/or patient representative appear to understand and agree to proceed. Vital Signs:   Vitals:    09/18/19 0730   BP: 118/68   Pulse: 85   Resp: 16   Temp: 97.3 °F (36.3 °C)   SpO2: 100%       Past Medical History:   has no past medical history on file. Past Surgical History:   has a past surgical history that includes Dental surgery; Ankle surgery; and Tympanostomy tube placement. Medications:   Scheduled Meds:   Continuous Infusions:   PRN Meds:   Home Meds:   Prior to Admission medications    Medication Sig Start Date End Date Taking? Authorizing Provider   ibuprofen (ADVIL;MOTRIN) 200 MG tablet Take 400 mg by mouth every 6 hours as needed for Pain   Yes Historical Provider, MD   Multiple Vitamins-Minerals (THERAPEUTIC MULTIVITAMIN-MINERALS) tablet Take 1 tablet by mouth daily   Yes Historical Provider, MD   magnesium 30 MG tablet Take 30 mg by mouth 2 times daily   Yes Historical Provider, MD   Ascorbic Acid (VITAMIN C) 250 MG tablet Take 250 mg by mouth daily   Yes Historical Provider, MD   acetaminophen (TYLENOL) 500 MG tablet Take 500 mg by mouth every 6 hours as needed for Pain   Yes Historical Provider, MD     Coumadin Use Last 7 Days:  no  Antiplatelet drug therapy use last 7 days: no  Other anticoagulant use last 7 days: no  Additional Medication Information:  na      Pre-Sedation Documentation and Exam:   I have reviewed the patient's history and review of systems.     Mallampati Airway Assessment:  normal    Prior History

## 2019-12-18 ENCOUNTER — HOSPITAL ENCOUNTER (OUTPATIENT)
Dept: GENERAL RADIOLOGY | Age: 34
Discharge: HOME | End: 2019-12-18
Payer: COMMERCIAL

## 2019-12-18 DIAGNOSIS — C50.919 MALIGNANT NEOPLASM OF FEMALE BREAST, UNSPECIFIED ESTROGEN RECEPTOR STATUS, UNSPECIFIED LATERALITY, UNSPECIFIED SITE OF BREAST (HCC): ICD-10-CM

## 2019-12-18 PROCEDURE — 36598 INJ W/FLUOR EVAL CV DEVICE: CPT

## 2020-01-10 ENCOUNTER — HOSPITAL ENCOUNTER (OUTPATIENT)
Dept: CT IMAGING | Age: 35
Discharge: HOME | End: 2020-01-10
Payer: COMMERCIAL

## 2020-01-10 PROCEDURE — 74177 CT ABD & PELVIS W/CONTRAST: CPT

## 2020-01-10 PROCEDURE — 6360000004 HC RX CONTRAST MEDICATION: Performed by: SURGERY

## 2020-01-10 RX ADMIN — IOHEXOL 50 ML: 240 INJECTION, SOLUTION INTRATHECAL; INTRAVASCULAR; INTRAVENOUS; ORAL at 14:18

## 2020-01-10 RX ADMIN — IOPAMIDOL 75 ML: 755 INJECTION, SOLUTION INTRAVENOUS at 14:18

## 2020-01-28 ENCOUNTER — HOSPITAL ENCOUNTER (OUTPATIENT)
Age: 35
Setting detail: SPECIMEN
Discharge: HOME | End: 2020-01-28
Payer: COMMERCIAL

## 2020-02-05 NOTE — PROGRESS NOTES
[facility] [employer] / [facility] ([facility]) [address] [facility], [address]    Acknowledgment of Informed Consent for Surgical or Medical Procedure and Sedation  I agree to allow doctor(s) Alyson Gallardo and his/her associates or assistants, including residents and/or other qualified medical practitioner to perform the following medical treatment or procedure and to administer or direct the administration of sedation as necessary:  Procedure(s): BILATERAL SIMPLE MASTECTOMY, LEFT SENTINEL NODE BIOPSY ([address])  My doctor has explained the following regarding the proposed procedure:   the explanation of the procedure   the benefits of the procedure   the potential problems that might occur during recuperation   the risks and side effects of the procedure which could include but are not limited to severe blood loss, infection, stroke or death   the benefits, risks and side effect of alternative procedures including the consequences of declining this procedure or any alternative procedures   the likelihood of achieving satisfactory results. I acknowledge no guarantee or assurance has been made to me regarding the results. I understand that during the course of this treatment/procedure, unforeseen conditions can occur which require an additional or different procedure. I agree to allow my physician or assistants to perform such extension of the original procedure as they may find necessary. I understand that sedation will often result in temporary impairment of memory and fine motor skills and that sedation can occasionally progress to a state of deep sedation or general anesthesia. I understand the risks of anesthesia for surgery include, but are not limited to, sore throat, hoarseness, injury to face, mouth, or teeth; nausea; headache; injury to blood vessels or nerves; death, brain damage, or paralysis.     I understand that if I have a Limitation of Treatment order in effect during my

## 2020-02-06 RX ORDER — LORATADINE 10 MG/1
10 TABLET ORAL DAILY
COMMUNITY

## 2020-02-06 RX ORDER — MULTIVITAMIN WITH IRON
100 TABLET ORAL DAILY
COMMUNITY

## 2020-02-06 RX ORDER — SENNA PLUS 8.6 MG/1
1 TABLET ORAL DAILY
COMMUNITY

## 2020-02-11 ENCOUNTER — ANESTHESIA EVENT (OUTPATIENT)
Dept: OPERATING ROOM | Age: 35
End: 2020-02-11
Payer: COMMERCIAL

## 2020-02-12 ENCOUNTER — HOSPITAL ENCOUNTER (OUTPATIENT)
Age: 35
Discharge: HOME | End: 2020-02-13
Attending: SURGERY | Admitting: SURGERY
Payer: COMMERCIAL

## 2020-02-12 ENCOUNTER — ANESTHESIA (OUTPATIENT)
Dept: OPERATING ROOM | Age: 35
End: 2020-02-12
Payer: COMMERCIAL

## 2020-02-12 VITALS — TEMPERATURE: 66 F | DIASTOLIC BLOOD PRESSURE: 52 MMHG | OXYGEN SATURATION: 100 % | SYSTOLIC BLOOD PRESSURE: 89 MMHG

## 2020-02-12 PROBLEM — C50.512 BREAST CANCER OF LOWER-OUTER QUADRANT OF LEFT FEMALE BREAST (HCC): Status: ACTIVE | Noted: 2020-02-12

## 2020-02-12 LAB
A/G RATIO: 2.1 (ref 1.1–2.2)
ALBUMIN SERPL-MCNC: 4.8 G/DL (ref 3.4–5)
ALP BLD-CCNC: 62 U/L (ref 40–129)
ALT SERPL-CCNC: 17 U/L (ref 10–40)
ANION GAP SERPL CALCULATED.3IONS-SCNC: 12 MMOL/L (ref 3–16)
AST SERPL-CCNC: 24 U/L (ref 15–37)
BILIRUB SERPL-MCNC: 0.6 MG/DL (ref 0–1)
BUN BLDV-MCNC: 9 MG/DL (ref 7–20)
CALCIUM SERPL-MCNC: 9.3 MG/DL (ref 8.3–10.6)
CHLORIDE BLD-SCNC: 100 MMOL/L (ref 99–110)
CO2: 26 MMOL/L (ref 21–32)
CREAT SERPL-MCNC: <0.5 MG/DL (ref 0.6–1.1)
GFR AFRICAN AMERICAN: >60
GFR NON-AFRICAN AMERICAN: >60
GLOBULIN: 2.3 G/DL
GLUCOSE BLD-MCNC: 91 MG/DL (ref 70–99)
GLUCOSE BLD-MCNC: 95 MG/DL (ref 70–99)
HCT VFR BLD CALC: 40.3 % (ref 36–48)
HEMOGLOBIN: 14 G/DL (ref 12–16)
MCH RBC QN AUTO: 37.2 PG (ref 26–34)
MCHC RBC AUTO-ENTMCNC: 34.9 G/DL (ref 31–36)
MCV RBC AUTO: 106.6 FL (ref 80–100)
PDW BLD-RTO: 11.9 % (ref 12.4–15.4)
PERFORMED ON: NORMAL
PLATELET # BLD: 272 K/UL (ref 135–450)
PMV BLD AUTO: 6.8 FL (ref 5–10.5)
POTASSIUM SERPL-SCNC: 3.9 MMOL/L (ref 3.5–5.1)
PREGNANCY, URINE: NEGATIVE
RBC # BLD: 3.78 M/UL (ref 4–5.2)
SODIUM BLD-SCNC: 138 MMOL/L (ref 136–145)
TOTAL PROTEIN: 7.1 G/DL (ref 6.4–8.2)
WBC # BLD: 7.1 K/UL (ref 4–11)

## 2020-02-12 PROCEDURE — 6360000002 HC RX W HCPCS

## 2020-02-12 PROCEDURE — 7100000001 HC PACU RECOVERY - ADDTL 15 MIN: Performed by: SURGERY

## 2020-02-12 PROCEDURE — 80053 COMPREHEN METABOLIC PANEL: CPT

## 2020-02-12 PROCEDURE — 6370000000 HC RX 637 (ALT 250 FOR IP): Performed by: ANESTHESIOLOGY

## 2020-02-12 PROCEDURE — 3700000001 HC ADD 15 MINUTES (ANESTHESIA): Performed by: SURGERY

## 2020-02-12 PROCEDURE — 3600000014 HC SURGERY LEVEL 4 ADDTL 15MIN: Performed by: SURGERY

## 2020-02-12 PROCEDURE — 2500000003 HC RX 250 WO HCPCS: Performed by: NURSE ANESTHETIST, CERTIFIED REGISTERED

## 2020-02-12 PROCEDURE — 2580000003 HC RX 258: Performed by: NURSE ANESTHETIST, CERTIFIED REGISTERED

## 2020-02-12 PROCEDURE — 2580000003 HC RX 258: Performed by: ANESTHESIOLOGY

## 2020-02-12 PROCEDURE — 2709999900 HC NON-CHARGEABLE SUPPLY: Performed by: SURGERY

## 2020-02-12 PROCEDURE — 88307 TISSUE EXAM BY PATHOLOGIST: CPT

## 2020-02-12 PROCEDURE — 88342 IMHCHEM/IMCYTCHM 1ST ANTB: CPT

## 2020-02-12 PROCEDURE — 3700000000 HC ANESTHESIA ATTENDED CARE: Performed by: SURGERY

## 2020-02-12 PROCEDURE — 84703 CHORIONIC GONADOTROPIN ASSAY: CPT

## 2020-02-12 PROCEDURE — 6360000002 HC RX W HCPCS: Performed by: SURGERY

## 2020-02-12 PROCEDURE — 6360000002 HC RX W HCPCS: Performed by: ANESTHESIOLOGY

## 2020-02-12 PROCEDURE — 6370000000 HC RX 637 (ALT 250 FOR IP): Performed by: SURGERY

## 2020-02-12 PROCEDURE — 2580000003 HC RX 258: Performed by: SURGERY

## 2020-02-12 PROCEDURE — 85027 COMPLETE CBC AUTOMATED: CPT

## 2020-02-12 PROCEDURE — 6370000000 HC RX 637 (ALT 250 FOR IP): Performed by: NURSE ANESTHETIST, CERTIFIED REGISTERED

## 2020-02-12 PROCEDURE — 3600000004 HC SURGERY LEVEL 4 BASE: Performed by: SURGERY

## 2020-02-12 PROCEDURE — 7100000000 HC PACU RECOVERY - FIRST 15 MIN: Performed by: SURGERY

## 2020-02-12 PROCEDURE — 6360000002 HC RX W HCPCS: Performed by: NURSE ANESTHETIST, CERTIFIED REGISTERED

## 2020-02-12 RX ORDER — FENTANYL CITRATE 50 UG/ML
INJECTION, SOLUTION INTRAMUSCULAR; INTRAVENOUS PRN
Status: DISCONTINUED | OUTPATIENT
Start: 2020-02-12 | End: 2020-02-12 | Stop reason: SDUPTHER

## 2020-02-12 RX ORDER — HYDROCODONE BITARTRATE AND ACETAMINOPHEN 5; 325 MG/1; MG/1
2 TABLET ORAL EVERY 4 HOURS PRN
Status: DISCONTINUED | OUTPATIENT
Start: 2020-02-12 | End: 2020-02-13 | Stop reason: HOSPADM

## 2020-02-12 RX ORDER — OXYCODONE HYDROCHLORIDE AND ACETAMINOPHEN 5; 325 MG/1; MG/1
1 TABLET ORAL PRN
Status: COMPLETED | OUTPATIENT
Start: 2020-02-12 | End: 2020-02-12

## 2020-02-12 RX ORDER — FENTANYL CITRATE 50 UG/ML
25 INJECTION, SOLUTION INTRAMUSCULAR; INTRAVENOUS EVERY 5 MIN PRN
Status: DISCONTINUED | OUTPATIENT
Start: 2020-02-12 | End: 2020-02-12 | Stop reason: HOSPADM

## 2020-02-12 RX ORDER — LORAZEPAM 2 MG/ML
0.5 INJECTION INTRAMUSCULAR
Status: DISCONTINUED | OUTPATIENT
Start: 2020-02-12 | End: 2020-02-13 | Stop reason: HOSPADM

## 2020-02-12 RX ORDER — MAGNESIUM HYDROXIDE 1200 MG/15ML
LIQUID ORAL CONTINUOUS PRN
Status: COMPLETED | OUTPATIENT
Start: 2020-02-12 | End: 2020-02-12

## 2020-02-12 RX ORDER — MEPERIDINE HYDROCHLORIDE 25 MG/ML
12.5 INJECTION INTRAMUSCULAR; INTRAVENOUS; SUBCUTANEOUS EVERY 5 MIN PRN
Status: DISCONTINUED | OUTPATIENT
Start: 2020-02-12 | End: 2020-02-12 | Stop reason: HOSPADM

## 2020-02-12 RX ORDER — MIDAZOLAM HYDROCHLORIDE 1 MG/ML
INJECTION INTRAMUSCULAR; INTRAVENOUS PRN
Status: DISCONTINUED | OUTPATIENT
Start: 2020-02-12 | End: 2020-02-12 | Stop reason: SDUPTHER

## 2020-02-12 RX ORDER — SODIUM CHLORIDE, SODIUM LACTATE, POTASSIUM CHLORIDE, CALCIUM CHLORIDE 600; 310; 30; 20 MG/100ML; MG/100ML; MG/100ML; MG/100ML
INJECTION, SOLUTION INTRAVENOUS CONTINUOUS
Status: DISCONTINUED | OUTPATIENT
Start: 2020-02-12 | End: 2020-02-13

## 2020-02-12 RX ORDER — PROPOFOL 10 MG/ML
INJECTION, EMULSION INTRAVENOUS PRN
Status: DISCONTINUED | OUTPATIENT
Start: 2020-02-12 | End: 2020-02-12 | Stop reason: SDUPTHER

## 2020-02-12 RX ORDER — ONDANSETRON 2 MG/ML
4 INJECTION INTRAMUSCULAR; INTRAVENOUS
Status: DISCONTINUED | OUTPATIENT
Start: 2020-02-12 | End: 2020-02-12 | Stop reason: HOSPADM

## 2020-02-12 RX ORDER — MIDAZOLAM HYDROCHLORIDE 1 MG/ML
2 INJECTION INTRAMUSCULAR; INTRAVENOUS ONCE
Status: COMPLETED | OUTPATIENT
Start: 2020-02-12 | End: 2020-02-12

## 2020-02-12 RX ORDER — MORPHINE SULFATE 4 MG/ML
4 INJECTION, SOLUTION INTRAMUSCULAR; INTRAVENOUS
Status: DISCONTINUED | OUTPATIENT
Start: 2020-02-12 | End: 2020-02-13 | Stop reason: HOSPADM

## 2020-02-12 RX ORDER — DEXAMETHASONE SODIUM PHOSPHATE 4 MG/ML
INJECTION, SOLUTION INTRA-ARTICULAR; INTRALESIONAL; INTRAMUSCULAR; INTRAVENOUS; SOFT TISSUE PRN
Status: DISCONTINUED | OUTPATIENT
Start: 2020-02-12 | End: 2020-02-12 | Stop reason: SDUPTHER

## 2020-02-12 RX ORDER — SODIUM CHLORIDE, SODIUM LACTATE, POTASSIUM CHLORIDE, CALCIUM CHLORIDE 600; 310; 30; 20 MG/100ML; MG/100ML; MG/100ML; MG/100ML
INJECTION, SOLUTION INTRAVENOUS CONTINUOUS
Status: DISCONTINUED | OUTPATIENT
Start: 2020-02-12 | End: 2020-02-12

## 2020-02-12 RX ORDER — DIPHENHYDRAMINE HYDROCHLORIDE 50 MG/ML
12.5 INJECTION INTRAMUSCULAR; INTRAVENOUS
Status: DISCONTINUED | OUTPATIENT
Start: 2020-02-12 | End: 2020-02-12 | Stop reason: HOSPADM

## 2020-02-12 RX ORDER — NEOSTIGMINE METHYLSULFATE 5 MG/5 ML
SYRINGE (ML) INTRAVENOUS PRN
Status: DISCONTINUED | OUTPATIENT
Start: 2020-02-12 | End: 2020-02-12 | Stop reason: SDUPTHER

## 2020-02-12 RX ORDER — MORPHINE SULFATE 2 MG/ML
2 INJECTION, SOLUTION INTRAMUSCULAR; INTRAVENOUS
Status: DISCONTINUED | OUTPATIENT
Start: 2020-02-12 | End: 2020-02-13 | Stop reason: HOSPADM

## 2020-02-12 RX ORDER — LORAZEPAM 2 MG/ML
INJECTION INTRAMUSCULAR
Status: COMPLETED
Start: 2020-02-12 | End: 2020-02-12

## 2020-02-12 RX ORDER — METHYLENE BLUE 10 MG/ML
INJECTION INTRAVENOUS PRN
Status: DISCONTINUED | OUTPATIENT
Start: 2020-02-12 | End: 2020-02-12 | Stop reason: ALTCHOICE

## 2020-02-12 RX ORDER — SODIUM CHLORIDE 9 MG/ML
INJECTION, SOLUTION INTRAVENOUS CONTINUOUS PRN
Status: DISCONTINUED | OUTPATIENT
Start: 2020-02-12 | End: 2020-02-12 | Stop reason: SDUPTHER

## 2020-02-12 RX ORDER — PROCHLORPERAZINE EDISYLATE 5 MG/ML
5 INJECTION INTRAMUSCULAR; INTRAVENOUS
Status: DISCONTINUED | OUTPATIENT
Start: 2020-02-12 | End: 2020-02-12 | Stop reason: HOSPADM

## 2020-02-12 RX ORDER — SODIUM CHLORIDE 0.9 % (FLUSH) 0.9 %
10 SYRINGE (ML) INJECTION PRN
Status: DISCONTINUED | OUTPATIENT
Start: 2020-02-12 | End: 2020-02-13 | Stop reason: HOSPADM

## 2020-02-12 RX ORDER — HYDROCODONE BITARTRATE AND ACETAMINOPHEN 5; 325 MG/1; MG/1
1 TABLET ORAL EVERY 4 HOURS PRN
Status: DISCONTINUED | OUTPATIENT
Start: 2020-02-12 | End: 2020-02-13 | Stop reason: HOSPADM

## 2020-02-12 RX ORDER — FENTANYL CITRATE 50 UG/ML
50 INJECTION, SOLUTION INTRAMUSCULAR; INTRAVENOUS EVERY 5 MIN PRN
Status: DISCONTINUED | OUTPATIENT
Start: 2020-02-12 | End: 2020-02-12 | Stop reason: HOSPADM

## 2020-02-12 RX ORDER — GLYCOPYRROLATE 0.2 MG/ML
INJECTION INTRAMUSCULAR; INTRAVENOUS PRN
Status: DISCONTINUED | OUTPATIENT
Start: 2020-02-12 | End: 2020-02-12 | Stop reason: SDUPTHER

## 2020-02-12 RX ORDER — ONDANSETRON 2 MG/ML
4 INJECTION INTRAMUSCULAR; INTRAVENOUS EVERY 6 HOURS PRN
Status: DISCONTINUED | OUTPATIENT
Start: 2020-02-12 | End: 2020-02-13 | Stop reason: HOSPADM

## 2020-02-12 RX ORDER — ALBUTEROL SULFATE 90 UG/1
AEROSOL, METERED RESPIRATORY (INHALATION) PRN
Status: DISCONTINUED | OUTPATIENT
Start: 2020-02-12 | End: 2020-02-12 | Stop reason: SDUPTHER

## 2020-02-12 RX ORDER — OXYCODONE HYDROCHLORIDE AND ACETAMINOPHEN 5; 325 MG/1; MG/1
2 TABLET ORAL PRN
Status: COMPLETED | OUTPATIENT
Start: 2020-02-12 | End: 2020-02-12

## 2020-02-12 RX ORDER — PROMETHAZINE HYDROCHLORIDE 25 MG/ML
6.25 INJECTION, SOLUTION INTRAMUSCULAR; INTRAVENOUS EVERY 6 HOURS PRN
Status: DISCONTINUED | OUTPATIENT
Start: 2020-02-12 | End: 2020-02-13 | Stop reason: HOSPADM

## 2020-02-12 RX ORDER — ONDANSETRON 2 MG/ML
INJECTION INTRAMUSCULAR; INTRAVENOUS PRN
Status: DISCONTINUED | OUTPATIENT
Start: 2020-02-12 | End: 2020-02-12 | Stop reason: SDUPTHER

## 2020-02-12 RX ORDER — HYDRALAZINE HYDROCHLORIDE 20 MG/ML
5 INJECTION INTRAMUSCULAR; INTRAVENOUS EVERY 10 MIN PRN
Status: DISCONTINUED | OUTPATIENT
Start: 2020-02-12 | End: 2020-02-12 | Stop reason: HOSPADM

## 2020-02-12 RX ORDER — LABETALOL 20 MG/4 ML (5 MG/ML) INTRAVENOUS SYRINGE
5 EVERY 10 MIN PRN
Status: DISCONTINUED | OUTPATIENT
Start: 2020-02-12 | End: 2020-02-12 | Stop reason: HOSPADM

## 2020-02-12 RX ORDER — HYDROMORPHONE HCL 110MG/55ML
PATIENT CONTROLLED ANALGESIA SYRINGE INTRAVENOUS PRN
Status: DISCONTINUED | OUTPATIENT
Start: 2020-02-12 | End: 2020-02-12 | Stop reason: SDUPTHER

## 2020-02-12 RX ORDER — LIDOCAINE HYDROCHLORIDE 20 MG/ML
INJECTION, SOLUTION INTRAVENOUS PRN
Status: DISCONTINUED | OUTPATIENT
Start: 2020-02-12 | End: 2020-02-12 | Stop reason: SDUPTHER

## 2020-02-12 RX ORDER — ROCURONIUM BROMIDE 10 MG/ML
INJECTION, SOLUTION INTRAVENOUS PRN
Status: DISCONTINUED | OUTPATIENT
Start: 2020-02-12 | End: 2020-02-12 | Stop reason: SDUPTHER

## 2020-02-12 RX ORDER — SODIUM CHLORIDE 0.9 % (FLUSH) 0.9 %
10 SYRINGE (ML) INJECTION EVERY 12 HOURS SCHEDULED
Status: DISCONTINUED | OUTPATIENT
Start: 2020-02-12 | End: 2020-02-13 | Stop reason: HOSPADM

## 2020-02-12 RX ADMIN — Medication 5 MG: at 14:25

## 2020-02-12 RX ADMIN — GLYCOPYRROLATE 0.4 MG: 0.2 INJECTION, SOLUTION INTRAMUSCULAR; INTRAVENOUS at 13:01

## 2020-02-12 RX ADMIN — Medication 8 PUFF: at 14:48

## 2020-02-12 RX ADMIN — FENTANYL CITRATE 50 MCG: 50 INJECTION, SOLUTION INTRAMUSCULAR; INTRAVENOUS at 15:10

## 2020-02-12 RX ADMIN — PHENYLEPHRINE HYDROCHLORIDE 200 MCG: 10 INJECTION, SOLUTION INTRAMUSCULAR; INTRAVENOUS; SUBCUTANEOUS at 13:35

## 2020-02-12 RX ADMIN — DEXTROSE MONOHYDRATE 2 G: 50 INJECTION, SOLUTION INTRAVENOUS at 21:47

## 2020-02-12 RX ADMIN — LORAZEPAM 0.5 MG: 2 INJECTION INTRAMUSCULAR at 15:27

## 2020-02-12 RX ADMIN — ROCURONIUM BROMIDE 50 MG: 10 INJECTION, SOLUTION INTRAVENOUS at 12:20

## 2020-02-12 RX ADMIN — PHENYLEPHRINE HYDROCHLORIDE 200 MCG: 10 INJECTION, SOLUTION INTRAMUSCULAR; INTRAVENOUS; SUBCUTANEOUS at 13:01

## 2020-02-12 RX ADMIN — PROPOFOL 100 MG: 10 INJECTION, EMULSION INTRAVENOUS at 12:20

## 2020-02-12 RX ADMIN — PHENYLEPHRINE HYDROCHLORIDE 100 MCG: 10 INJECTION, SOLUTION INTRAMUSCULAR; INTRAVENOUS; SUBCUTANEOUS at 13:51

## 2020-02-12 RX ADMIN — FENTANYL CITRATE 100 MCG: 50 INJECTION INTRAMUSCULAR; INTRAVENOUS at 12:16

## 2020-02-12 RX ADMIN — MIDAZOLAM 2 MG: 1 INJECTION INTRAMUSCULAR; INTRAVENOUS at 11:41

## 2020-02-12 RX ADMIN — CEFAZOLIN 2 G: 10 INJECTION, POWDER, FOR SOLUTION INTRAVENOUS at 12:14

## 2020-02-12 RX ADMIN — LORAZEPAM 0.5 MG: 2 INJECTION INTRAMUSCULAR; INTRAVENOUS at 15:27

## 2020-02-12 RX ADMIN — HYDROMORPHONE HYDROCHLORIDE 0.5 MG: 1 INJECTION, SOLUTION INTRAMUSCULAR; INTRAVENOUS; SUBCUTANEOUS at 15:34

## 2020-02-12 RX ADMIN — SODIUM CHLORIDE: 9 INJECTION, SOLUTION INTRAVENOUS at 14:35

## 2020-02-12 RX ADMIN — FENTANYL CITRATE 50 MCG: 50 INJECTION, SOLUTION INTRAMUSCULAR; INTRAVENOUS at 15:17

## 2020-02-12 RX ADMIN — DEXAMETHASONE SODIUM PHOSPHATE 8 MG: 4 INJECTION, SOLUTION INTRAMUSCULAR; INTRAVENOUS at 12:23

## 2020-02-12 RX ADMIN — PHENYLEPHRINE HYDROCHLORIDE 100 MCG: 10 INJECTION, SOLUTION INTRAMUSCULAR; INTRAVENOUS; SUBCUTANEOUS at 12:59

## 2020-02-12 RX ADMIN — ONDANSETRON 4 MG: 2 INJECTION INTRAMUSCULAR; INTRAVENOUS at 12:23

## 2020-02-12 RX ADMIN — SODIUM CHLORIDE, POTASSIUM CHLORIDE, SODIUM LACTATE AND CALCIUM CHLORIDE: 600; 310; 30; 20 INJECTION, SOLUTION INTRAVENOUS at 17:10

## 2020-02-12 RX ADMIN — OXYCODONE HYDROCHLORIDE AND ACETAMINOPHEN 1 TABLET: 5; 325 TABLET ORAL at 16:52

## 2020-02-12 RX ADMIN — GLYCOPYRROLATE 0.8 MG: 0.2 INJECTION, SOLUTION INTRAMUSCULAR; INTRAVENOUS at 14:25

## 2020-02-12 RX ADMIN — HYDROMORPHONE HYDROCHLORIDE 2 MG: 2 INJECTION, SOLUTION INTRAMUSCULAR; INTRAVENOUS; SUBCUTANEOUS at 12:46

## 2020-02-12 RX ADMIN — HYDROMORPHONE HYDROCHLORIDE 0.5 MG: 1 INJECTION, SOLUTION INTRAMUSCULAR; INTRAVENOUS; SUBCUTANEOUS at 15:23

## 2020-02-12 RX ADMIN — SODIUM CHLORIDE, SODIUM LACTATE, POTASSIUM CHLORIDE, AND CALCIUM CHLORIDE: 600; 310; 30; 20 INJECTION, SOLUTION INTRAVENOUS at 10:42

## 2020-02-12 RX ADMIN — LIDOCAINE HYDROCHLORIDE 100 MG: 20 INJECTION, SOLUTION INTRAVENOUS at 14:48

## 2020-02-12 RX ADMIN — SODIUM CHLORIDE, SODIUM LACTATE, POTASSIUM CHLORIDE, AND CALCIUM CHLORIDE: 600; 310; 30; 20 INJECTION, SOLUTION INTRAVENOUS at 12:51

## 2020-02-12 RX ADMIN — MIDAZOLAM HYDROCHLORIDE 2 MG: 2 INJECTION, SOLUTION INTRAMUSCULAR; INTRAVENOUS at 12:14

## 2020-02-12 RX ADMIN — HYDROCODONE BITARTRATE AND ACETAMINOPHEN 1 TABLET: 5; 325 TABLET ORAL at 22:53

## 2020-02-12 RX ADMIN — LIDOCAINE HYDROCHLORIDE 100 MG: 20 INJECTION, SOLUTION INTRAVENOUS at 12:18

## 2020-02-12 ASSESSMENT — PULMONARY FUNCTION TESTS
PIF_VALUE: 12
PIF_VALUE: 14
PIF_VALUE: 25
PIF_VALUE: 14
PIF_VALUE: 12
PIF_VALUE: 0
PIF_VALUE: 14
PIF_VALUE: 14
PIF_VALUE: 2
PIF_VALUE: 14
PIF_VALUE: 26
PIF_VALUE: 14
PIF_VALUE: 36
PIF_VALUE: 14
PIF_VALUE: 13
PIF_VALUE: 0
PIF_VALUE: 7
PIF_VALUE: 14
PIF_VALUE: 14
PIF_VALUE: 0
PIF_VALUE: 4
PIF_VALUE: 14
PIF_VALUE: 0
PIF_VALUE: 15
PIF_VALUE: 13
PIF_VALUE: 14
PIF_VALUE: 14
PIF_VALUE: 1
PIF_VALUE: 0
PIF_VALUE: 14
PIF_VALUE: 13
PIF_VALUE: 14
PIF_VALUE: 1
PIF_VALUE: 14
PIF_VALUE: 13
PIF_VALUE: 14
PIF_VALUE: 14
PIF_VALUE: 18
PIF_VALUE: 19
PIF_VALUE: 15
PIF_VALUE: 14
PIF_VALUE: 23
PIF_VALUE: 15
PIF_VALUE: 14
PIF_VALUE: 37
PIF_VALUE: 14
PIF_VALUE: 15
PIF_VALUE: 15
PIF_VALUE: 18
PIF_VALUE: 21
PIF_VALUE: 14
PIF_VALUE: 15
PIF_VALUE: 14
PIF_VALUE: 0
PIF_VALUE: 14
PIF_VALUE: 26
PIF_VALUE: 26
PIF_VALUE: 14
PIF_VALUE: 14
PIF_VALUE: 1
PIF_VALUE: 14
PIF_VALUE: 15
PIF_VALUE: 13
PIF_VALUE: 26
PIF_VALUE: 32
PIF_VALUE: 14
PIF_VALUE: 12
PIF_VALUE: 14
PIF_VALUE: 14
PIF_VALUE: 19
PIF_VALUE: 18
PIF_VALUE: 14
PIF_VALUE: 15
PIF_VALUE: 14
PIF_VALUE: 15
PIF_VALUE: 14
PIF_VALUE: 14
PIF_VALUE: 49
PIF_VALUE: 14
PIF_VALUE: 13
PIF_VALUE: 15
PIF_VALUE: 14
PIF_VALUE: 15
PIF_VALUE: 2
PIF_VALUE: 15
PIF_VALUE: 14
PIF_VALUE: 13
PIF_VALUE: 14
PIF_VALUE: 26
PIF_VALUE: 0
PIF_VALUE: 14
PIF_VALUE: 18
PIF_VALUE: 14
PIF_VALUE: 13
PIF_VALUE: 14
PIF_VALUE: 37
PIF_VALUE: 2
PIF_VALUE: 14
PIF_VALUE: 15
PIF_VALUE: 16
PIF_VALUE: 14
PIF_VALUE: 14
PIF_VALUE: 13
PIF_VALUE: 14
PIF_VALUE: 15
PIF_VALUE: 14
PIF_VALUE: 14
PIF_VALUE: 15
PIF_VALUE: 2
PIF_VALUE: 6
PIF_VALUE: 14
PIF_VALUE: 13
PIF_VALUE: 14
PIF_VALUE: 14
PIF_VALUE: 13
PIF_VALUE: 14
PIF_VALUE: 12
PIF_VALUE: 14
PIF_VALUE: 1
PIF_VALUE: 14
PIF_VALUE: 13
PIF_VALUE: 15
PIF_VALUE: 1
PIF_VALUE: 14
PIF_VALUE: 3
PIF_VALUE: 15
PIF_VALUE: 15
PIF_VALUE: 0
PIF_VALUE: 14
PIF_VALUE: 15
PIF_VALUE: 15
PIF_VALUE: 14
PIF_VALUE: 14
PIF_VALUE: 12
PIF_VALUE: 14
PIF_VALUE: 26
PIF_VALUE: 15
PIF_VALUE: 14
PIF_VALUE: 14

## 2020-02-12 ASSESSMENT — PAIN DESCRIPTION - PAIN TYPE
TYPE: SURGICAL PAIN
TYPE: SURGICAL PAIN
TYPE: ACUTE PAIN
TYPE: SURGICAL PAIN
TYPE: SURGICAL PAIN

## 2020-02-12 ASSESSMENT — PAIN DESCRIPTION - DESCRIPTORS
DESCRIPTORS: ACHING;DISCOMFORT
DESCRIPTORS: ACHING

## 2020-02-12 ASSESSMENT — PAIN DESCRIPTION - FREQUENCY
FREQUENCY: INTERMITTENT
FREQUENCY: CONTINUOUS

## 2020-02-12 ASSESSMENT — PAIN DESCRIPTION - PROGRESSION: CLINICAL_PROGRESSION: NOT CHANGED

## 2020-02-12 ASSESSMENT — PAIN - FUNCTIONAL ASSESSMENT
PAIN_FUNCTIONAL_ASSESSMENT: ACTIVITIES ARE NOT PREVENTED
PAIN_FUNCTIONAL_ASSESSMENT: 0-10

## 2020-02-12 ASSESSMENT — PAIN DESCRIPTION - ONSET
ONSET: ON-GOING
ONSET: ON-GOING
ONSET: GRADUAL
ONSET: ON-GOING

## 2020-02-12 ASSESSMENT — PAIN SCALES - GENERAL
PAINLEVEL_OUTOF10: 9
PAINLEVEL_OUTOF10: 8
PAINLEVEL_OUTOF10: 5
PAINLEVEL_OUTOF10: 6
PAINLEVEL_OUTOF10: 6

## 2020-02-12 ASSESSMENT — PAIN DESCRIPTION - ORIENTATION
ORIENTATION: LEFT
ORIENTATION: LEFT;RIGHT

## 2020-02-12 ASSESSMENT — PAIN DESCRIPTION - LOCATION
LOCATION: BREAST
LOCATION: CHEST
LOCATION: BREAST

## 2020-02-12 NOTE — PROGRESS NOTES
Pt extremely anxious, already given 100 mcg of fentanyl and 0.5 mg of dilaudid with no relief, pt now crying. Called Dr. Heather Mcdonough given per [facility]

## 2020-02-12 NOTE — PROGRESS NOTES
Department of Surgery:  Post-op Note    CC: Left UOQ Invasive Ductal Carcinoma of the Breast    Subjective:   Patient states that the pain is controlled. She denies nausea or vomiting. She has not had anything to eat since surgery but is tolerating fluids. Has not been out of bed.     Objective:  Anesthesia type: General      I/O    Intra op    Post op     Fluids  1000 mL 700 mL      mL 0 mL     Urine       Physical Exam:  Vitals:    02/12/20 1615 02/12/20 1630 02/12/20 1645 02/12/20 1700   BP: (!) 97/50 (!) 100/42 111/61 116/73   Pulse: 73 77 77 92   Resp: 8 10 14 18   Temp:       TempSrc:       SpO2: 99% 100% 100% 97%   Weight:       Height:           General appearance: alert, no acute distress, grooming appropriate  Chest/Lungs: CTAB, no crackles/rales, no wheezes or rhonchi, normal effort, on RA  Breast: left breast dressings c/d/i, appropriately tender; right breast dressings c/d/i, appropriately tender, left axillary dressing c/d/i, appropriately tender; left drain with 10cc of SS output, right drain with 10cc of SS output  Cardiovascular: RRR, no murmurs/gallops/rubs  Abdomen: soft, non-tender, non-distended,   : no kothari  Extremities: no edema, no cyanosis  Neuro: A&Ox3, no focal deficits, sensation intact    Assessment and Plan  This is a 36y.o. year old female with a diagnosis of left UOQ invasive ductal carcinoma of the breast s/p left simple mastectomy with sentinel node biopsy  right simple mastectomy POD #0    Pain management: Norco, morphine  Cardiovascular: Hemodynamically stable, will continue to monitor  Respiratory: extubated, encourage hourly incentive spirometry and deep breathing  FEN:  Fluids: LR @ 125ml/hr, Diet: General  : remains a void check  Wound: local care, will need drain teaching,   Ambulation: OOB to chair, encourage ambulation  Prophylaxis: SCDs    Stable postop check    Dick Pierce DO  02/12/20  5:05 PM     NICHOLAS left 55 cc  NICHOLAS right 45 cc  Bilateral mastectomy sites: no collections, no ecchymoses, no swelling, axillas soft, flaps viable    A/P post op check: bilateral mastectomy: stable post op check. Good pain control. Diet and activity as tolerated.       Dereje Holbrook MD   [phone]

## 2020-02-12 NOTE — H&P
Lesvia Kwon    [id number]    [facility] Same Day Surgery Update H & P  Department of General Surgery   Surgical Service   Pre-operative History and Physical  Last H & P within the last 30 days. DIAGNOSIS:   Malignant neoplasm of left female breast, unspecified estrogen receptor status, unspecified site of breast ([facility] [C50.912]    PROCEDURE:  BILATERAL SIMPLE MASTECTOMY; LEFT SENTINEL NODE BIOPSY (BLUE DYE ONLY)      HISTORY OF PRESENT ILLNESS:   Patient with invasive ductal carcinoma has completed chemotherapy and presents today for the above procedure. Past Medical History:        Diagnosis Date    Breast cancer [facility])     last chemo Deep 15, 2020     Dental crowns present      Past Surgical History:        Procedure Laterality Date    ANKLE SURGERY  2000    right ankle    DENTAL SURGERY      wisdom teeth    TYMPANOSTOMY TUBE PLACEMENT      x2     Past Social History:  Social History     Socioeconomic History    Marital status:      Spouse name: None    Number of children: None    Years of education: None    Highest education level: None   Occupational History    None   Social Needs    Financial resource strain: None    Food insecurity:     Worry: None     Inability: None    Transportation needs:     Medical: None     Non-medical: None   Tobacco Use    Smoking status: Current Every Day Smoker     Packs/day: 0.25     Years: 15.00     Pack years: 3.75    Smokeless tobacco: Never Used    Tobacco comment: in process of quitting    Substance and Sexual Activity    Alcohol use:  Yes     Alcohol/week: 0.0 standard drinks     Comment: occ    Drug use: No    Sexual activity: Yes     Partners: Male   Lifestyle    Physical activity:     Days per week: None     Minutes per session: None    Stress: None   Relationships    Social connections:     Talks on phone: None     Gets together: None     Attends Yazidism service: None     Active member of club or organization: None     Attends meetings of clubs or organizations: None     Relationship status: None    Intimate partner violence:     Fear of current or ex partner: None     Emotionally abused: None     Physically abused: None     Forced sexual activity: None   Other Topics Concern    None   Social History Narrative    None         Medications Prior to Admission:      Prior to Admission medications    Medication Sig Start Date End Date Taking? Authorizing Provider   loratadine (CLARITIN) 10 MG tablet Take 10 mg by mouth daily   Yes Historical Provider, MD   vitamin B-6 (PYRIDOXINE) 100 MG tablet Take 100 mg by mouth daily   Yes Historical Provider, MD   Calcium Carb-Cholecalciferol (CALCIUM-VITAMIN D) 500-200 MG-UNIT per tablet Take 1 tablet by mouth 2 times daily (with meals)   Yes Historical Provider, MD   senna (SENOKOT) 8.6 MG tablet Take 1 tablet by mouth daily   Yes Historical Provider, MD   acetaminophen (TYLENOL) 500 MG tablet Take 500 mg by mouth every 6 hours as needed for Pain   Yes Historical Provider, MD   Multiple Vitamins-Minerals (THERAPEUTIC MULTIVITAMIN-MINERALS) tablet Take 1 tablet by mouth daily    Historical Provider, MD   Ascorbic Acid (VITAMIN C) 250 MG tablet Take 250 mg by mouth daily    Historical Provider, MD         Allergies:  No known allergies    PHYSICAL EXAM:      /79   Pulse 72   Temp 97.9 °F (36.6 °C) (Oral)   Resp 18   Ht 5' 8\" (1.727 m)   Wt 158 lb (71.7 kg)   LMP 10/01/2019 (Approximate)   SpO2 96%   BMI 24.02 kg/m²      Airway:  Airway patent with no audible stridor    Heart:  regular rate and rhythm, No murmur noted    Lungs:  No increased work of breathing, good air exchange, clear to auscultation bilaterally, no crackles or wheezing    Abdomen:  soft, non-distended, non-tender, no rebound tenderness or guarding, normal active bowel sounds and no masses palpated    ASSESSMENT AND PLAN     Patient is a 36 y.o. female with above specified procedure planned.     1.  Patient seen and focused

## 2020-02-12 NOTE — ANESTHESIA POSTPROCEDURE EVALUATION
Department of Anesthesiology  Postprocedure Note    Patient: Ema Concepcion  MRN: [id number]  [place]: 1985  Date of evaluation: 2/12/2020  Time:  5:54 PM     Procedure Summary     Date:  02/12/20 Room / Location:  [address] [place] [address] [address] / [facility]    Anesthesia Start:  1214 Anesthesia Stop:  1500    Procedure:  BILATERAL SIMPLE MASTECTOMY; LEFT SENTINEL NODE BIOPSY ([address]) (Bilateral ) Diagnosis:       Malignant neoplasm of left female breast, unspecified estrogen receptor status, unspecified site of breast ([facility]      (Malignant neoplasm of left female breast, unspecified estrogen receptor status, unspecified site of breast ([facility] [C50.912])    Surgeon:  Kranthi Alfonso MD Responsible Provider:  Keisha Ferrari DO    Anesthesia Type:  general ASA Status:  3          Anesthesia Type: general    Gopi Phase I: Gopi Score: 10    Gopi Phase II:      Last vitals: Reviewed and per EMR flowsheets.        Anesthesia Post Evaluation    Patient location during evaluation: PACU  Patient participation: complete - patient participated  Level of consciousness: awake  Pain score: 4  Airway patency: patent  Nausea & Vomiting: no nausea and no vomiting  Complications: no  Cardiovascular status: hemodynamically stable  Respiratory status: acceptable  Hydration status: euvolemic

## 2020-02-12 NOTE — PROGRESS NOTES
Patient admitted to PACU # 9 from OR at 1455 post 1815 [address]; LEFT SENTINEL NODE BIOPSY (BLUE DYE ONLY) (Bilateral ) per Dr. Gustabo Shipley. Attached to PACU monitoring system and report received from anesthesia provider. Patient was reported to be hemodynamically stable during procedure. Patient unresponsive d/t anesthesia with oral airway still in, pt has no s/s of pain currently.

## 2020-02-12 NOTE — PROGRESS NOTES
Report received from [facility] PAC[facility] RN; awaiting patient arrival to unit. Will continue to monitor. ..      Electronically signed by Maxim Calderon RN on 2/12/20 at 6:47 PM

## 2020-02-12 NOTE — ANESTHESIA PRE PROCEDURE
vaginal delivery O80       Past Medical History:        Diagnosis Date    Breast cancer [facility])     last chemo Deep 15, 2020     Dental crowns present        Past Surgical History:        Procedure Laterality Date    ANKLE SURGERY  2000    right ankle    DENTAL SURGERY      wisdom teeth    TYMPANOSTOMY TUBE PLACEMENT      x2       Social History:    Social History     Tobacco Use    Smoking status: Current Every Day Smoker     Packs/day: 0.25     Years: 15.00     Pack years: 3.75    Smokeless tobacco: Never Used    Tobacco comment: in process of quitting    Substance Use Topics    Alcohol use: Yes     Alcohol/week: 0.0 standard drinks     Comment: occ                                Ready to quit: Not Answered  Counseling given: Not Answered  Comment: in process of quitting       Vital Signs (Current):   Vitals:    02/06/20 1428 02/12/20 1007   BP:  117/79   Pulse:  72   Resp:  18   Temp:  97.9 °F (36.6 °C)   TempSrc:  Oral   SpO2:  96%   Weight: 158 lb (71.7 kg) 158 lb (71.7 kg)   Height: 5' 8\" (1.727 m) 5' 8\" (1.727 m)                                              BP Readings from Last 3 Encounters:   02/12/20 117/79   09/18/19 109/67   04/27/19 114/74       NPO Status: Time of last liquid consumption: 0742(8 oz water)                        Time of last solid consumption: 1800                        Date of last liquid consumption: 02/12/20                        Date of last solid food consumption: 02/11/20    BMI:   Wt Readings from Last 3 Encounters:   02/12/20 158 lb (71.7 kg)   09/18/19 158 lb (71.7 kg)   04/24/19 184 lb (83.5 kg)     Body mass index is 24.02 kg/m².     CBC:   Lab Results   Component Value Date    WBC 7.1 02/12/2020    RBC 3.78 02/12/2020    HGB 14.0 02/12/2020    HCT 40.3 02/12/2020    .6 02/12/2020    RDW 11.9 02/12/2020     02/12/2020       CMP:   Lab Results   Component Value Date     02/12/2020    K 3.9 02/12/2020     02/12/2020    CO2 26 02/12/2020    BUN 9 02/12/2020    CREATININE <0.5 02/12/2020    GFRAA >60 02/12/2020    AGRATIO 2.1 02/12/2020    LABGLOM >60 02/12/2020    GLUCOSE 91 02/12/2020    PROT 7.1 02/12/2020    CALCIUM 9.3 02/12/2020    BILITOT 0.6 02/12/2020    ALKPHOS 62 02/12/2020    AST 24 02/12/2020    ALT 17 02/12/2020       POC Tests:   Recent Labs     02/12/20  1100   POCGLU 95       Coags:   Lab Results   Component Value Date    PROTIME 10.8 09/18/2019    INR 0.95 09/18/2019       HCG (If Applicable):   Lab Results   Component Value Date    PREGTESTUR Negative 02/12/2020        ABGs: No results found for: PHART, PO2ART, KMC3DJE, AAR7KME, BEART, S8AFSXKY     Type & Screen (If Applicable):  No results found for: LABABO, [address]    Anesthesia Evaluation  Patient summary reviewed and Nursing notes reviewed no history of anesthetic complications:   Airway: Mallampati: I  TM distance: >3 FB   Neck ROM: full   Dental: normal exam         Pulmonary:Negative Pulmonary ROS                              Cardiovascular:Negative CV ROS            Rhythm: regular  Rate: normal                    Neuro/Psych:   Negative Neuro/Psych ROS              GI/Hepatic/Renal: Neg GI/Hepatic/Renal ROS            Endo/Other:                      ROS comment: Breast CA s/p chemotherapy Abdominal:           Vascular: negative vascular ROS. Anesthesia Plan      general     ASA 3       Induction: intravenous. MIPS: Prophylactic antiemetics administered. Anesthetic plan and risks discussed with patient. Plan discussed with CRNA.                   Alvin Singh DO   2/12/2020

## 2020-02-13 VITALS
HEART RATE: 82 BPM | HEIGHT: 68 IN | WEIGHT: 158 LBS | RESPIRATION RATE: 18 BRPM | BODY MASS INDEX: 23.95 KG/M2 | SYSTOLIC BLOOD PRESSURE: 124 MMHG | TEMPERATURE: 97.8 F | DIASTOLIC BLOOD PRESSURE: 77 MMHG | OXYGEN SATURATION: 97 %

## 2020-02-13 PROCEDURE — 6360000002 HC RX W HCPCS: Performed by: SURGERY

## 2020-02-13 PROCEDURE — 6370000000 HC RX 637 (ALT 250 FOR IP): Performed by: STUDENT IN AN ORGANIZED HEALTH CARE EDUCATION/TRAINING PROGRAM

## 2020-02-13 PROCEDURE — 6370000000 HC RX 637 (ALT 250 FOR IP): Performed by: SURGERY

## 2020-02-13 PROCEDURE — 2580000003 HC RX 258: Performed by: SURGERY

## 2020-02-13 RX ORDER — HYDROCODONE BITARTRATE AND ACETAMINOPHEN 5; 325 MG/1; MG/1
1 TABLET ORAL EVERY 6 HOURS PRN
Qty: 20 TABLET | Refills: 0 | Status: SHIPPED | OUTPATIENT
Start: 2020-02-13 | End: 2020-02-20

## 2020-02-13 RX ORDER — CEPHALEXIN 500 MG/1
500 CAPSULE ORAL 2 TIMES DAILY
Qty: 28 CAPSULE | Refills: 0 | Status: SHIPPED | OUTPATIENT
Start: 2020-02-13

## 2020-02-13 RX ADMIN — HYDROCODONE BITARTRATE AND ACETAMINOPHEN 2 TABLET: 5; 325 TABLET ORAL at 03:37

## 2020-02-13 RX ADMIN — HYDROCODONE BITARTRATE AND ACETAMINOPHEN 1 TABLET: 5; 325 TABLET ORAL at 08:40

## 2020-02-13 RX ADMIN — DEXTROSE MONOHYDRATE 2 G: 50 INJECTION, SOLUTION INTRAVENOUS at 02:16

## 2020-02-13 RX ADMIN — BENZOCAINE AND MENTHOL 5 LOZENGE: 15; 3.6 LOZENGE ORAL at 02:13

## 2020-02-13 ASSESSMENT — PAIN SCALES - GENERAL
PAINLEVEL_OUTOF10: 7
PAINLEVEL_OUTOF10: 6
PAINLEVEL_OUTOF10: 6
PAINLEVEL_OUTOF10: 0

## 2020-02-13 ASSESSMENT — PAIN DESCRIPTION - PAIN TYPE: TYPE: SURGICAL PAIN

## 2020-02-13 ASSESSMENT — PAIN DESCRIPTION - LOCATION: LOCATION: BREAST

## 2020-02-13 ASSESSMENT — PAIN DESCRIPTION - FREQUENCY: FREQUENCY: INTERMITTENT

## 2020-02-13 ASSESSMENT — PAIN DESCRIPTION - DESCRIPTORS: DESCRIPTORS: ACHING;CONSTANT

## 2020-02-13 ASSESSMENT — PAIN DESCRIPTION - ONSET: ONSET: ON-GOING

## 2020-02-13 NOTE — PROGRESS NOTES
Resumed care of pt, Pt doing well at this time, no needs. States pain is controlled. NICHOLAS drains emptied.

## 2020-02-13 NOTE — CARE COORDINATION
Case Management Assessment            Discharge Note                    Date / Time of Note: 2/13/2020 9:45 AM                  Discharge Note Completed by: Joseph Neville    Patient Name: Lupis Barker   YOB: 1985  Diagnosis: Malignant neoplasm of left female breast, unspecified estrogen receptor status, unspecified site of breast ([facility] [C50.912]  Breast cancer of lower-outer quadrant of left female breast ([facility] [C50.512]  Breast cancer of lower-outer quadrant of left female breast ([facility] Adolfo Gray   Date / Time: 2/12/2020  9:46 AM    Current PCP: Angela Jimenez, [facility] patient: No    Hospitalization in the last 30 days: No    Advance Directives:  Code Status: Full Code  [place] DNR form completed and on chart: No    Financial:  Payor: Charles Espinoza / Plan: Charles Espinoza / Product Type: *No Product type* /      Pharmacy:    [employer] [employer] [address] [phone] [facility] [phone]  [address]  Phone: [phone] Fax: [phone]    [facility] [address] - [facility] [place] [address]  Phone: [phone] Fax: [phone]      Assistance purchasing medications?: Potential Assistance Purchasing Medications: No  Assistance provided by Case Management: None at this time    Does patient want to participate in local refill/ meds to beds program?: No    Meds To Beds General Rules:  1. Can ONLY be done Monday- Friday between 8:30am-5pm  2. Prescription(s) must be in pharmacy by 3pm to be filled same day  3. Copy of patient's insurance/ prescription drug card and patient face sheet must be sent along with the prescription(s)  4. Cost of Rx cannot be added to hospital bill. If financial assistance is needed, please contact unit  or ;   or  CANNOT provide pharmacy voucher for patients RN  [facility]  Case Management Department  Ph: [phone]  Fax: [phone]

## 2020-02-13 NOTE — PROGRESS NOTES
Pt d/c'd by wheelchair to home via     All scripts in hand    Teaching and teach back on drain care complete      Electronically signed by Fredo Almonte RN on 2/13/20 at 9:56 AM

## 2020-02-13 NOTE — DISCHARGE SUMMARY
Physician Discharge Summary     Patient ID:  Radha Hahn  [id number]  11 y.o.  1985    Admit date: 2/12/2020    Discharge date and time: 2/13/20, 8:14am    Admitting Physician: Tito Connolly MD     Discharge Physician: Tito Connolly MD     Admission Diagnoses: left breast cancer LOQ    Discharge Diagnoses: same    PMH:  has a past medical history of Breast cancer ([address] and Dental crowns present. PSH:  has a past surgical history that includes Dental surgery; Ankle surgery (2000); Tympanostomy tube placement; and Mastectomy (Bilateral, 2/12/2020). Allergies: No known allergies    Admission Condition: fair    Discharged Condition: stable    Indication for Admission: breast cancer    Hospital Course:   Patient with invasive ductal carcinoma has completed chemotherapy and presents today for the above procedure. Patient underwent: Left simple mastectomy with sentinel node biopsy, Right simple mastectomy  Post op:Patient states that the pain is controlled. She denies nausea or vomiting. She has not had anything to eat since surgery but is tolerating fluids. Has not been out of bed. POD 1 -In good spirits this AM. Patient is reporting that her pain is well-controlled, received NICHOLAS teaching.  Patient discharged home with  in stable condition     Discharge Physical Exam:  General appearance: alert, no acute distress, grooming appropriate  Chest/Lungs: CTAB, no crackles/rales, no wheezes or rhonchi, normal effort, on RA  Breast: left breast dressings c/d/i, appropriately tender; right breast dressings c/d/i, appropriately tender, left axillary dressing c/d/i, appropriately tender; left drain with 123cc of SS output, right drain with 105cc of SS output  Cardiovascular: RRR, no murmurs/gallops/rubs  Abdomen: soft, non-tender, non-distended,   : no kothari  Extremities: no edema, no cyanosis  Neuro: A&Ox3, no focal deficits, sensation intact    Consults:   N/A    Significant Diagnostic Studies:

## 2020-02-13 NOTE — PLAN OF CARE
Problem: Pain:  Goal: Pain level will decrease  Description  Pain level will decrease  Outcome: Ongoing   Patient c/o surgical site pain. Pain controlled with prn PO medication as ordered. Will monitor. Problem: Skin Integrity:  Goal: Demonstration of wound healing without infection will improve  Description  Demonstration of wound healing without infection will improve  Outcome: Ongoing   Patient afebrile. Surgical sites CDI. Will monitor.

## 2020-02-13 NOTE — PROGRESS NOTES
General Surgery  Daily Progress Note    CC: Left UOQ Invasive Ductal Carcinoma of the Breast    Subjective: In good spirits this AM. Patient is reporting that her pain is well-controlled. ROS:   A 14 point review of systems was conducted, significant findings as noted in HPI. All other systems negative.     Objective:  Physical Exam:  Vitals:    02/12/20 1905 02/12/20 2014 02/12/20 2300 02/13/20 0209   BP: (!) 157/83 118/78 104/66 98/62   Pulse: 96 85 74 79   Resp: 18 18 18 18   Temp: 98.2 °F (36.8 °C) 98 °F (36.7 °C) 98.2 °F (36.8 °C) 98.5 °F (36.9 °C)   TempSrc: Oral Oral Oral Oral   SpO2: 94% 95% 96% 96%   Weight:       Height:         General appearance: alert, no acute distress, grooming appropriate  Chest/Lungs: CTAB, no crackles/rales, no wheezes or rhonchi, normal effort, on RA  Breast: left breast dressings c/d/i, appropriately tender; right breast dressings c/d/i, appropriately tender, left axillary dressing c/d/i, appropriately tender; left drain with 123cc of SS output, right drain with 105cc of SS output  Cardiovascular: RRR, no murmurs/gallops/rubs  Abdomen: soft, non-tender, non-distended,   : no kothari  Extremities: no edema, no cyanosis  Neuro: A&Ox3, no focal deficits, sensation intact    Assessment and Plan  This is a 36y.o. year old female with a diagnosis of left UOQ invasive ductal carcinoma of the breast s/p left simple mastectomy with sentinel node biopsy  right simple mastectomy POD 1.    - PO pain meds  - General diet, SLIV  - NICHOLAS teaching  - Discharge today    Melida House MD, MPH  PGY-1 General Surgery  02/13/20  6:39 AM  [phone]

## 2020-02-20 NOTE — OP NOTE
fullness was noted at the 4 o'clock position of the left breast.   This total area was approximately 1 cm in size. The clavipectoral fascia was opened, and a blue-stained afferent  lymphatic channel was followed distally to a soft blue-stained lymph  node. Further palpation of the axillae did reveal several additional  firm lymph nodes and each of these nodes were grasped and removed. The  fatty tissue was removed up to the axillary vein. Vascular and  lymphatic channels were clipped as they were encountered. No further  abnormalities were noted within the axillae. Both mastectomy cavities  were copiously irrigated with saline solution. 10-mm Aj drains were placed through separate stab incisions in the  anterior axillary line and anchored into place with 2-0 silk sutures. The subcutaneous tissue was closed with interrupted 3-0 Vicryl suture  and the skin was closed with 4-0 Vicryl subcuticular stitch. Benzoin,  Steri's, dry sterile gauze, and _____ dressing were applied. All needle  and sponge counts were correct. The patient was returned to recovery in  good condition. I was present for the entire procedure. Stephy Hernandez MD    D: 02/19/2020 17:52:14       T: 02/19/2020 22:31:10     /[email or web address]  Job#: [id number]     Doc#: [id number]    CC:  MD Francisco Carrillo MD Gaynell Patricia [facility]       Donna Pitts.  Kiran Mcnair MD

## 2020-07-09 ENCOUNTER — HOSPITAL ENCOUNTER (OUTPATIENT)
Dept: NUCLEAR MEDICINE | Age: 35
Discharge: HOME | End: 2020-07-09
Payer: COMMERCIAL

## 2020-07-09 PROCEDURE — 78306 BONE IMAGING WHOLE BODY: CPT

## 2020-07-09 PROCEDURE — 3430000000 HC RX DIAGNOSTIC RADIOPHARMACEUTICAL: Performed by: INTERNAL MEDICINE

## 2020-07-09 PROCEDURE — A9503 TC99M MEDRONATE: HCPCS | Performed by: INTERNAL MEDICINE

## 2020-07-09 RX ORDER — TC 99M MEDRONATE 20 MG/10ML
25.5 INJECTION, POWDER, LYOPHILIZED, FOR SOLUTION INTRAVENOUS
Status: COMPLETED | OUTPATIENT
Start: 2020-07-09 | End: 2020-07-09

## 2020-07-09 RX ADMIN — TC 99M MEDRONATE 25.5 MILLICURIE: 20 INJECTION, POWDER, LYOPHILIZED, FOR SOLUTION INTRAVENOUS at 09:27

## 2020-07-15 ENCOUNTER — HOSPITAL ENCOUNTER (OUTPATIENT)
Dept: CT IMAGING | Age: 35
Discharge: HOME | End: 2020-07-15
Payer: COMMERCIAL

## 2020-07-15 PROCEDURE — 6360000004 HC RX CONTRAST MEDICATION: Performed by: INTERNAL MEDICINE

## 2020-07-15 PROCEDURE — 70491 CT SOFT TISSUE NECK W/DYE: CPT

## 2020-07-15 PROCEDURE — 74177 CT ABD & PELVIS W/CONTRAST: CPT

## 2020-07-15 RX ADMIN — IOHEXOL 50 ML: 240 INJECTION, SOLUTION INTRATHECAL; INTRAVASCULAR; INTRAVENOUS; ORAL at 10:45

## 2020-07-15 RX ADMIN — IOPAMIDOL 150 ML: 755 INJECTION, SOLUTION INTRAVENOUS at 10:44

## 2020-07-17 ENCOUNTER — OFFICE VISIT (OUTPATIENT)
Dept: PRIMARY CARE CLINIC | Age: 35
End: 2020-07-17
Payer: COMMERCIAL

## 2020-07-17 PROCEDURE — 99211 OFF/OP EST MAY X REQ PHY/QHP: CPT | Performed by: NURSE PRACTITIONER

## 2020-07-17 NOTE — PROGRESS NOTES
Enrrique Bolaños received a viral test for COVID-19. They were educated on isolation and quarantine as appropriate. For any symptoms, they were directed to seek care from their PCP, given contact information to establish with a doctor, directed to an urgent care or the emergency room.

## 2020-07-20 LAB — SARS-COV-2: NOT DETECTED

## 2020-07-23 ENCOUNTER — HOSPITAL ENCOUNTER (OUTPATIENT)
Dept: CT IMAGING | Age: 35
Discharge: HOME | End: 2020-07-23
Payer: COMMERCIAL

## 2020-07-23 VITALS
TEMPERATURE: 98 F | DIASTOLIC BLOOD PRESSURE: 71 MMHG | SYSTOLIC BLOOD PRESSURE: 112 MMHG | RESPIRATION RATE: 16 BRPM | HEIGHT: 68 IN | WEIGHT: 151 LBS | HEART RATE: 80 BPM | OXYGEN SATURATION: 99 % | BODY MASS INDEX: 22.88 KG/M2

## 2020-07-23 LAB
INR BLD: 0.94 (ref 0.86–1.14)
PLATELET # BLD: 299 K/UL (ref 135–450)
PREGNANCY, URINE: NEGATIVE
PROTHROMBIN TIME: 10.9 SEC (ref 10–13.2)

## 2020-07-23 PROCEDURE — 6370000000 HC RX 637 (ALT 250 FOR IP): Performed by: RADIOLOGY

## 2020-07-23 PROCEDURE — 7100000011 HC PHASE II RECOVERY - ADDTL 15 MIN

## 2020-07-23 PROCEDURE — 88307 TISSUE EXAM BY PATHOLOGIST: CPT

## 2020-07-23 PROCEDURE — 7100000010 HC PHASE II RECOVERY - FIRST 15 MIN

## 2020-07-23 PROCEDURE — 88342 IMHCHEM/IMCYTCHM 1ST ANTB: CPT

## 2020-07-23 PROCEDURE — 77012 CT SCAN FOR NEEDLE BIOPSY: CPT

## 2020-07-23 PROCEDURE — 88341 IMHCHEM/IMCYTCHM EA ADD ANTB: CPT

## 2020-07-23 PROCEDURE — 88311 DECALCIFY TISSUE: CPT

## 2020-07-23 PROCEDURE — 84703 CHORIONIC GONADOTROPIN ASSAY: CPT

## 2020-07-23 PROCEDURE — 85049 AUTOMATED PLATELET COUNT: CPT

## 2020-07-23 PROCEDURE — 88360 TUMOR IMMUNOHISTOCHEM/MANUAL: CPT

## 2020-07-23 PROCEDURE — 85610 PROTHROMBIN TIME: CPT

## 2020-07-23 PROCEDURE — 2709999900 CT BIOPSY DEEP BONE PERCUTANEOUS

## 2020-07-23 PROCEDURE — 6360000002 HC RX W HCPCS: Performed by: RADIOLOGY

## 2020-07-23 PROCEDURE — 36415 COLL VENOUS BLD VENIPUNCTURE: CPT

## 2020-07-23 RX ORDER — FENTANYL CITRATE 50 UG/ML
INJECTION, SOLUTION INTRAMUSCULAR; INTRAVENOUS
Status: COMPLETED | OUTPATIENT
Start: 2020-07-23 | End: 2020-07-23

## 2020-07-23 RX ORDER — ONDANSETRON 2 MG/ML
4 INJECTION INTRAMUSCULAR; INTRAVENOUS EVERY 8 HOURS PRN
Status: DISCONTINUED | OUTPATIENT
Start: 2020-07-23 | End: 2020-07-24 | Stop reason: HOSPADM

## 2020-07-23 RX ORDER — ANASTROZOLE 1 MG/1
1 TABLET ORAL
COMMUNITY
Start: 2020-07-21

## 2020-07-23 RX ORDER — MIDAZOLAM HYDROCHLORIDE 5 MG/ML
INJECTION INTRAMUSCULAR; INTRAVENOUS
Status: COMPLETED | OUTPATIENT
Start: 2020-07-23 | End: 2020-07-23

## 2020-07-23 RX ORDER — ACETAMINOPHEN 325 MG/1
650 TABLET ORAL EVERY 4 HOURS PRN
Status: DISCONTINUED | OUTPATIENT
Start: 2020-07-23 | End: 2020-07-24 | Stop reason: HOSPADM

## 2020-07-23 RX ADMIN — MIDAZOLAM HYDROCHLORIDE 1 MG: 5 INJECTION, SOLUTION INTRAMUSCULAR; INTRAVENOUS at 09:49

## 2020-07-23 RX ADMIN — Medication 50 MCG: at 09:46

## 2020-07-23 RX ADMIN — MIDAZOLAM HYDROCHLORIDE 1 MG: 5 INJECTION, SOLUTION INTRAMUSCULAR; INTRAVENOUS at 09:46

## 2020-07-23 RX ADMIN — MIDAZOLAM HYDROCHLORIDE 1 MG: 5 INJECTION, SOLUTION INTRAMUSCULAR; INTRAVENOUS at 10:03

## 2020-07-23 RX ADMIN — Medication 50 MCG: at 09:49

## 2020-07-23 RX ADMIN — ACETAMINOPHEN 650 MG: 325 TABLET ORAL at 11:41

## 2020-07-23 RX ADMIN — Medication 50 MCG: at 10:02

## 2020-07-23 ASSESSMENT — PAIN - FUNCTIONAL ASSESSMENT: PAIN_FUNCTIONAL_ASSESSMENT: 0-10

## 2020-07-23 ASSESSMENT — PAIN SCALES - GENERAL: PAINLEVEL_OUTOF10: 6

## 2020-07-23 NOTE — PROGRESS NOTES
Preoperative Screening for Elective Surgery/Invasive Procedures While COVID-19 present in the community     Have you tested positive or have been told to self-isolate for COVID-19 like symptoms within the past 28 days? no   Do you currently have any of the following symptoms? o Fever >100.0 F or 99.9 F in immunocompromised patients? o New onset cough, shortness of breath or difficulty breathing?  o New onset sore throat, myalgia (muscle aches and pains), headache, loss of taste/smell or diarrhea? no   Have you had a potential exposure to COVID-19 within the past 14 days by:  o Close contact with a confirmed case?no  o Close contact with a healthcare worker,  or essential infrastructure worker (grocery store, [employer], gas station, public utilities or transportation)? o Do you reside in a congregate setting such as; skilled nursing facility, adult home, correctional facility, homeless shelter or other institutional setting?  o Have you had recent travel to a known COVID-19 hotspot? Indicate if the patient has a positive screen by answering yes to one or more of the above questions. Patients who test positive or screen positive prior to surgery or on the day of surgery should be evaluated in conjunction with the surgeon/proceduralist/anesthesiologist to determine the urgency of the procedure.

## 2020-07-23 NOTE — BRIEF OP NOTE
Brief Postoperative Note    Pam Castaneda  YOB: 1985  [id number]    Pre-operative Diagnosis: metastatic breast cancer    Post-operative Diagnosis: Same    Procedure: L4 bone lesion biopsy    Anesthesia: Moderate Sedation    Surgeons/Assistants: Dr. Alirio Leal    Estimated Blood Loss: less than 5ml     Complications: None    Specimens: Was Obtained: 11G core biopsy L4 lesion    Findings: single core biopsy L4 lesion was submitted to pathology.     Electronically signed by Irene Jo MD on 7/23/2020 at 10:34 AM

## 2020-07-23 NOTE — PRE SEDATION
Sedation Pre-Procedure Note    Patient Name: Lala Samuel   YOB: 1985  Room/Bed: Room/bed info not found  Medical Record Number: [id number]  Date: 7/23/2020   Time: 9:36 AM       Indication:  Breast cancer with bone lesions    Consent: I have discussed with the patient and/or the patient representative the indication, alternatives, and the possible risks and/or complications of the planned procedure and the anesthesia methods. The patient and/or patient representative appear to understand and agree to proceed. Vital Signs:   Vitals:    07/23/20 0845   BP: 121/80   Pulse: 83   Resp: 16   Temp: 98.3 °F (36.8 °C)   SpO2: 98%       Past Medical History:   has a past medical history of Breast cancer ([facility] [address] and Dental crowns present. Past Surgical History:   has a past surgical history that includes Dental surgery; Ankle surgery (2000); Tympanostomy tube placement; and Mastectomy (Bilateral, 2/12/2020). Medications:   Scheduled Meds:   Continuous Infusions:   PRN Meds:   Home Meds:   Prior to Admission medications    Medication Sig Start Date End Date Taking?  Authorizing Provider   anastrozole (ARIMIDEX) 1 MG tablet 1 mg 7/21/20  Yes Historical Provider, MD   cephALEXin (KEFLEX) 500 MG capsule Take 1 capsule by mouth 2 times daily 2/13/20  Yes Camille Dai MD   loratadine (CLARITIN) 10 MG tablet Take 10 mg by mouth daily   Yes Historical Provider, MD   vitamin B-6 (PYRIDOXINE) 100 MG tablet Take 100 mg by mouth daily   Yes Historical Provider, MD   Calcium Carb-Cholecalciferol (CALCIUM-VITAMIN D) 500-200 MG-UNIT per tablet Take 1 tablet by mouth 2 times daily (with meals)   Yes Historical Provider, MD   senna (SENOKOT) 8.6 MG tablet Take 1 tablet by mouth daily   Yes Historical Provider, MD   Multiple Vitamins-Minerals (THERAPEUTIC MULTIVITAMIN-MINERALS) tablet Take 1 tablet by mouth daily   Yes Historical Provider, MD   Ascorbic Acid (VITAMIN C) 250 MG tablet Take 250 mg by mouth daily   Yes Historical Provider, MD   acetaminophen (TYLENOL) 500 MG tablet Take 500 mg by mouth every 6 hours as needed for Pain   Yes Historical Provider, MD     Coumadin Use Last 7 Days:  no  Antiplatelet drug therapy use last 7 days: no  Other anticoagulant use last 7 days: no  Additional Medication Information:        Pre-Sedation Documentation and Exam:   I have reviewed the patient's history and review of systems.     Mallampati Airway Assessment:  normal neck range of motion    Prior History of Anesthesia Complications:   none    ASA Classification:  Class 2 - A normal healthy patient with mild systemic disease    Sedation/ Anesthesia Plan:   intravenous sedation    Medications Planned:   midazolam (Versed) intravenously and fentanyl intravenously    Patient is an appropriate candidate for plan of sedation: yes    Electronically signed by Jacob Hankins MD on 7/23/2020 at 9:36 AM

## 2020-08-28 ENCOUNTER — HOSPITAL ENCOUNTER (OUTPATIENT)
Dept: MRI IMAGING | Age: 35
Discharge: HOME | End: 2020-08-28
Payer: COMMERCIAL

## 2020-08-28 PROCEDURE — 6360000004 HC RX CONTRAST MEDICATION: Performed by: RADIOLOGY

## 2020-08-28 PROCEDURE — 72157 MRI CHEST SPINE W/O & W/DYE: CPT

## 2020-08-28 PROCEDURE — 72156 MRI NECK SPINE W/O & W/DYE: CPT

## 2020-08-28 PROCEDURE — A9579 GAD-BASE MR CONTRAST NOS,1ML: HCPCS | Performed by: RADIOLOGY

## 2020-08-28 RX ADMIN — GADOTERIDOL 14 ML: 279.3 INJECTION, SOLUTION INTRAVENOUS at 10:19

## 2020-09-03 ENCOUNTER — HOSPITAL ENCOUNTER (OUTPATIENT)
Dept: MRI IMAGING | Age: 35
Discharge: HOME | End: 2020-09-03
Payer: COMMERCIAL

## 2020-09-03 PROCEDURE — 6360000004 HC RX CONTRAST MEDICATION: Performed by: RADIOLOGY

## 2020-09-03 PROCEDURE — 72158 MRI LUMBAR SPINE W/O & W/DYE: CPT

## 2020-09-03 PROCEDURE — A9579 GAD-BASE MR CONTRAST NOS,1ML: HCPCS | Performed by: RADIOLOGY

## 2020-09-03 RX ADMIN — GADOTERIDOL 15 ML: 279.3 INJECTION, SOLUTION INTRAVENOUS at 09:59

## 2020-12-07 ENCOUNTER — HOSPITAL ENCOUNTER (OUTPATIENT)
Dept: MRI IMAGING | Age: 35
Discharge: HOME | End: 2020-12-07
Payer: COMMERCIAL

## 2020-12-07 PROCEDURE — A9579 GAD-BASE MR CONTRAST NOS,1ML: HCPCS | Performed by: NURSE PRACTITIONER

## 2020-12-07 PROCEDURE — 72156 MRI NECK SPINE W/O & W/DYE: CPT

## 2020-12-07 PROCEDURE — 6360000004 HC RX CONTRAST MEDICATION: Performed by: NURSE PRACTITIONER

## 2020-12-07 RX ADMIN — GADOTERIDOL 13 ML: 279.3 INJECTION, SOLUTION INTRAVENOUS at 10:52

## 2020-12-08 ENCOUNTER — HOSPITAL ENCOUNTER (OUTPATIENT)
Dept: NUCLEAR MEDICINE | Age: 35
Discharge: HOME | End: 2020-12-08
Payer: COMMERCIAL

## 2020-12-08 ENCOUNTER — HOSPITAL ENCOUNTER (OUTPATIENT)
Dept: CT IMAGING | Age: 35
Discharge: HOME | End: 2020-12-08
Payer: COMMERCIAL

## 2020-12-08 PROCEDURE — 3430000000 HC RX DIAGNOSTIC RADIOPHARMACEUTICAL: Performed by: INTERNAL MEDICINE

## 2020-12-08 PROCEDURE — 6360000004 HC RX CONTRAST MEDICATION: Performed by: OBSTETRICS & GYNECOLOGY

## 2020-12-08 PROCEDURE — A9503 TC99M MEDRONATE: HCPCS | Performed by: INTERNAL MEDICINE

## 2020-12-08 PROCEDURE — 74177 CT ABD & PELVIS W/CONTRAST: CPT

## 2020-12-08 PROCEDURE — 78306 BONE IMAGING WHOLE BODY: CPT

## 2020-12-08 RX ORDER — TC 99M MEDRONATE 20 MG/10ML
28.5 INJECTION, POWDER, LYOPHILIZED, FOR SOLUTION INTRAVENOUS
Status: COMPLETED | OUTPATIENT
Start: 2020-12-08 | End: 2020-12-08

## 2020-12-08 RX ADMIN — IOPAMIDOL 100 ML: 755 INJECTION, SOLUTION INTRAVENOUS at 10:41

## 2020-12-08 RX ADMIN — IOHEXOL 50 ML: 240 INJECTION, SOLUTION INTRATHECAL; INTRAVASCULAR; INTRAVENOUS; ORAL at 10:41

## 2020-12-08 RX ADMIN — TC 99M MEDRONATE 28.5 MILLICURIE: 20 INJECTION, POWDER, LYOPHILIZED, FOR SOLUTION INTRAVENOUS at 09:05

## 2021-03-02 ENCOUNTER — HOSPITAL ENCOUNTER (OUTPATIENT)
Dept: NUCLEAR MEDICINE | Age: 36
Discharge: HOME | End: 2021-03-02
Payer: COMMERCIAL

## 2021-03-02 ENCOUNTER — HOSPITAL ENCOUNTER (OUTPATIENT)
Dept: CT IMAGING | Age: 36
Discharge: HOME | End: 2021-03-02
Payer: COMMERCIAL

## 2021-03-02 ENCOUNTER — HOSPITAL ENCOUNTER (OUTPATIENT)
Age: 36
End: 2021-03-02
Payer: COMMERCIAL

## 2021-03-02 DIAGNOSIS — C50.512 MALIGNANT NEOPLASM OF LOWER-OUTER QUADRANT OF LEFT FEMALE BREAST, UNSPECIFIED ESTROGEN RECEPTOR STATUS (HCC): ICD-10-CM

## 2021-03-02 PROCEDURE — 74177 CT ABD & PELVIS W/CONTRAST: CPT

## 2021-03-02 PROCEDURE — 78306 BONE IMAGING WHOLE BODY: CPT

## 2021-03-02 PROCEDURE — 6360000004 HC RX CONTRAST MEDICATION: Performed by: INTERNAL MEDICINE

## 2021-03-02 PROCEDURE — A9503 TC99M MEDRONATE: HCPCS | Performed by: INTERNAL MEDICINE

## 2021-03-02 PROCEDURE — 3430000000 HC RX DIAGNOSTIC RADIOPHARMACEUTICAL: Performed by: INTERNAL MEDICINE

## 2021-03-02 RX ORDER — TC 99M MEDRONATE 20 MG/10ML
25 INJECTION, POWDER, LYOPHILIZED, FOR SOLUTION INTRAVENOUS
Status: COMPLETED | OUTPATIENT
Start: 2021-03-02 | End: 2021-03-02

## 2021-03-02 RX ADMIN — TC 99M MEDRONATE 25 MILLICURIE: 20 INJECTION, POWDER, LYOPHILIZED, FOR SOLUTION INTRAVENOUS at 06:45

## 2021-03-02 RX ADMIN — IOPAMIDOL 75 ML: 755 INJECTION, SOLUTION INTRAVENOUS at 06:27

## 2021-08-04 ENCOUNTER — HOSPITAL ENCOUNTER (OUTPATIENT)
Dept: NUCLEAR MEDICINE | Age: 36
Discharge: HOME | End: 2021-08-04
Payer: COMMERCIAL

## 2021-08-04 ENCOUNTER — HOSPITAL ENCOUNTER (OUTPATIENT)
Dept: CT IMAGING | Age: 36
Discharge: HOME | End: 2021-08-04
Payer: COMMERCIAL

## 2021-08-04 DIAGNOSIS — C50.512 MALIGNANT NEOPLASM OF LOWER-OUTER QUADRANT OF LEFT FEMALE BREAST, UNSPECIFIED ESTROGEN RECEPTOR STATUS (HCC): ICD-10-CM

## 2021-08-04 DIAGNOSIS — C79.51 METASTASIS TO BONE (HCC): ICD-10-CM

## 2021-08-04 PROCEDURE — 3430000000 HC RX DIAGNOSTIC RADIOPHARMACEUTICAL: Performed by: NURSE PRACTITIONER

## 2021-08-04 PROCEDURE — 74177 CT ABD & PELVIS W/CONTRAST: CPT

## 2021-08-04 PROCEDURE — 6360000004 HC RX CONTRAST MEDICATION: Performed by: NURSE PRACTITIONER

## 2021-08-04 PROCEDURE — 78306 BONE IMAGING WHOLE BODY: CPT

## 2021-08-04 PROCEDURE — A9503 TC99M MEDRONATE: HCPCS | Performed by: NURSE PRACTITIONER

## 2021-08-04 RX ORDER — TC 99M MEDRONATE 20 MG/10ML
26.2 INJECTION, POWDER, LYOPHILIZED, FOR SOLUTION INTRAVENOUS
Status: COMPLETED | OUTPATIENT
Start: 2021-08-04 | End: 2021-08-04

## 2021-08-04 RX ADMIN — IOHEXOL 50 ML: 240 INJECTION, SOLUTION INTRATHECAL; INTRAVASCULAR; INTRAVENOUS; ORAL at 10:22

## 2021-08-04 RX ADMIN — IOPAMIDOL 100 ML: 755 INJECTION, SOLUTION INTRAVENOUS at 10:22

## 2021-08-04 RX ADMIN — TC 99M MEDRONATE 26.2 MILLICURIE: 20 INJECTION, POWDER, LYOPHILIZED, FOR SOLUTION INTRAVENOUS at 09:14

## 2021-09-30 ENCOUNTER — APPOINTMENT (OUTPATIENT)
Dept: CT IMAGING | Age: 36
End: 2021-09-30
Payer: COMMERCIAL

## 2021-09-30 ENCOUNTER — HOSPITAL ENCOUNTER (EMERGENCY)
Age: 36
Discharge: HOME | End: 2021-09-30
Attending: EMERGENCY MEDICINE
Payer: COMMERCIAL

## 2021-09-30 ENCOUNTER — APPOINTMENT (OUTPATIENT)
Dept: MRI IMAGING | Age: 36
End: 2021-09-30
Payer: COMMERCIAL

## 2021-09-30 VITALS
HEART RATE: 70 BPM | SYSTOLIC BLOOD PRESSURE: 116 MMHG | WEIGHT: 150 LBS | BODY MASS INDEX: 22.73 KG/M2 | TEMPERATURE: 98.9 F | OXYGEN SATURATION: 98 % | HEIGHT: 68 IN | DIASTOLIC BLOOD PRESSURE: 55 MMHG | RESPIRATION RATE: 18 BRPM

## 2021-09-30 DIAGNOSIS — M54.32 SCIATICA OF LEFT SIDE: Primary | ICD-10-CM

## 2021-09-30 LAB
A/G RATIO: 1.9 (ref 1.1–2.2)
ALBUMIN SERPL-MCNC: 5.2 G/DL (ref 3.4–5)
ALP BLD-CCNC: 66 U/L (ref 40–129)
ALT SERPL-CCNC: 14 U/L (ref 10–40)
ANION GAP SERPL CALCULATED.3IONS-SCNC: 12 MMOL/L (ref 3–16)
ANISOCYTOSIS: ABNORMAL
AST SERPL-CCNC: 19 U/L (ref 15–37)
BASOPHILS ABSOLUTE: 0 K/UL (ref 0–0.2)
BASOPHILS RELATIVE PERCENT: 1.3 %
BILIRUB SERPL-MCNC: 0.4 MG/DL (ref 0–1)
BUN BLDV-MCNC: 9 MG/DL (ref 7–20)
CALCIUM SERPL-MCNC: 9.7 MG/DL (ref 8.3–10.6)
CHLORIDE BLD-SCNC: 102 MMOL/L (ref 99–110)
CO2: 27 MMOL/L (ref 21–32)
CREAT SERPL-MCNC: 0.6 MG/DL (ref 0.6–1.1)
EOSINOPHILS ABSOLUTE: 0.1 K/UL (ref 0–0.6)
EOSINOPHILS RELATIVE PERCENT: 2.1 %
GFR AFRICAN AMERICAN: >60
GFR NON-AFRICAN AMERICAN: >60
GLOBULIN: 2.7 G/DL
GLUCOSE BLD-MCNC: 93 MG/DL (ref 70–99)
HCT VFR BLD CALC: 38.2 % (ref 36–48)
HEMATOLOGY PATH CONSULT: YES
HEMOGLOBIN: 13.5 G/DL (ref 12–16)
LYMPHOCYTES ABSOLUTE: 0.6 K/UL (ref 1–5.1)
LYMPHOCYTES RELATIVE PERCENT: 16.4 %
MACROCYTES: ABNORMAL
MCH RBC QN AUTO: 42.1 PG (ref 26–34)
MCHC RBC AUTO-ENTMCNC: 35.3 G/DL (ref 31–36)
MCV RBC AUTO: 119.3 FL (ref 80–100)
MICROCYTES: ABNORMAL
MONOCYTES ABSOLUTE: 0.2 K/UL (ref 0–1.3)
MONOCYTES RELATIVE PERCENT: 4.2 %
NEUTROPHILS ABSOLUTE: 2.7 K/UL (ref 1.7–7.7)
NEUTROPHILS RELATIVE PERCENT: 76 %
OVALOCYTES: ABNORMAL
PDW BLD-RTO: 12.9 % (ref 12.4–15.4)
PLATELET # BLD: 269 K/UL (ref 135–450)
PLATELET SLIDE REVIEW: ADEQUATE
PMV BLD AUTO: 5.9 FL (ref 5–10.5)
POIKILOCYTES: ABNORMAL
POLYCHROMASIA: ABNORMAL
POTASSIUM REFLEX MAGNESIUM: 4.1 MMOL/L (ref 3.5–5.1)
RBC # BLD: 3.2 M/UL (ref 4–5.2)
SLIDE REVIEW: ABNORMAL
SODIUM BLD-SCNC: 141 MMOL/L (ref 136–145)
TOTAL PROTEIN: 7.9 G/DL (ref 6.4–8.2)
WBC # BLD: 3.5 K/UL (ref 4–11)

## 2021-09-30 PROCEDURE — 72125 CT NECK SPINE W/O DYE: CPT

## 2021-09-30 PROCEDURE — 99283 EMERGENCY DEPT VISIT LOW MDM: CPT

## 2021-09-30 PROCEDURE — 3209999900 CT THORACIC SPINE TRAUMA RECONSTRUCTION

## 2021-09-30 PROCEDURE — 71260 CT THORAX DX C+: CPT

## 2021-09-30 PROCEDURE — 85025 COMPLETE CBC W/AUTO DIFF WBC: CPT

## 2021-09-30 PROCEDURE — 3209999900 CT LUMBAR SPINE TRAUMA RECONSTRUCTION

## 2021-09-30 PROCEDURE — 72148 MRI LUMBAR SPINE W/O DYE: CPT

## 2021-09-30 PROCEDURE — 6360000002 HC RX W HCPCS: Performed by: EMERGENCY MEDICINE

## 2021-09-30 PROCEDURE — 6370000000 HC RX 637 (ALT 250 FOR IP): Performed by: EMERGENCY MEDICINE

## 2021-09-30 PROCEDURE — 6360000004 HC RX CONTRAST MEDICATION: Performed by: EMERGENCY MEDICINE

## 2021-09-30 PROCEDURE — 80053 COMPREHEN METABOLIC PANEL: CPT

## 2021-09-30 PROCEDURE — 96374 THER/PROPH/DIAG INJ IV PUSH: CPT

## 2021-09-30 RX ORDER — DEXAMETHASONE SODIUM PHOSPHATE 10 MG/ML
10 INJECTION INTRAMUSCULAR; INTRAVENOUS ONCE
Status: COMPLETED | OUTPATIENT
Start: 2021-09-30 | End: 2021-09-30

## 2021-09-30 RX ORDER — OXYCODONE HYDROCHLORIDE 5 MG/1
5 TABLET ORAL ONCE
Status: COMPLETED | OUTPATIENT
Start: 2021-09-30 | End: 2021-09-30

## 2021-09-30 RX ORDER — ONDANSETRON 4 MG/1
4 TABLET, ORALLY DISINTEGRATING ORAL ONCE
Status: COMPLETED | OUTPATIENT
Start: 2021-09-30 | End: 2021-09-30

## 2021-09-30 RX ORDER — METHOCARBAMOL 500 MG/1
500 TABLET, FILM COATED ORAL 4 TIMES DAILY
Qty: 40 TABLET | Refills: 0 | Status: SHIPPED | OUTPATIENT
Start: 2021-09-30 | End: 2021-10-10

## 2021-09-30 RX ORDER — METHYLPREDNISOLONE 4 MG/1
TABLET ORAL
Qty: 1 KIT | Refills: 0 | Status: SHIPPED | OUTPATIENT
Start: 2021-09-30 | End: 2021-10-06

## 2021-09-30 RX ADMIN — OXYCODONE 5 MG: 5 TABLET ORAL at 13:59

## 2021-09-30 RX ADMIN — DEXAMETHASONE SODIUM PHOSPHATE 10 MG: 10 INJECTION INTRAMUSCULAR; INTRAVENOUS at 16:04

## 2021-09-30 RX ADMIN — IOPAMIDOL 75 ML: 755 INJECTION, SOLUTION INTRAVENOUS at 15:34

## 2021-09-30 RX ADMIN — ONDANSETRON 4 MG: 4 TABLET, ORALLY DISINTEGRATING ORAL at 13:58

## 2021-09-30 ASSESSMENT — PAIN SCALES - GENERAL: PAINLEVEL_OUTOF10: 8

## 2021-09-30 ASSESSMENT — PAIN DESCRIPTION - LOCATION: LOCATION: BACK

## 2021-09-30 NOTE — ED PROVIDER NOTES
CHIEF COMPLAINT  Back Pain (Pt reports lower back pain since last Thurs, pt reports hx stage 4 metastatic breast ca, with locations in spine. Pt reports pain radiates from back down bilateral legs to knees. )      HISTORY OF PRESENT ILLNESS  Bartolome Cutler is a 29 y.o. female with a history of metastatic breast cancer currently on chemotherapy with known metastasis to the spine who presents emergency department for evaluation of back pain. Patient states that she has had lower back pain since last Thursday. She states that she has stage IV metastatic breast cancer with known metastasis to the spine. She states that over the past several days she has had worsening pain in her low back and she has since yesterday has had pain radiating from her lumbar spinal region with radiation down her bilateral legs to her knees. She reports that she feels increased weakness of the bilateral lower extremities, left greater than right. She states that this is new and has not happened before. She denies any fevers. She reports taking home tramadol without significant provement of her pain. She was advised by her oncologist to come to the emergency department for evaluation. She has no bowel or bladder incontinence. She has had no recent falls or injuries. No other complaints, modifying factors or associated symptoms. I have reviewed the following from the nursing documentation.     Past Medical History:   Diagnosis Date    Breast cancer [facility])     last chemo Deep 15, 2020     Dental crowns present      Past Surgical History:   Procedure Laterality Date    ANKLE SURGERY  2000    right ankle    CT BIOPSY PERCUTANEOUS DEEP BONE  7/23/2020    CT BIOPSY PERCUTANEOUS DEEP BONE 7/23/2020 [place] SCAN    DENTAL SURGERY      wisdom teeth    MASTECTOMY Bilateral 2/12/2020    BILATERAL SIMPLE MASTECTOMY; LEFT SENTINEL NODE BIOPSY (BLUE DYE ONLY) performed by Maira Toribio MD at [address] [address]      x2 Family History   Problem Relation Age of Onset    Substance Abuse Mother         alcoholic    Substance Abuse Brother 28        heroine use    High Blood Pressure Maternal Grandfather      Social History     Socioeconomic History    Marital status:      Spouse name: Not on file    Number of children: Not on file    Years of education: Not on file    Highest education level: Not on file   Occupational History    Not on file   Tobacco Use    Smoking status: Current Every Day Smoker     Packs/day: 0.50     Years: 15.00     Pack years: 7.50    Smokeless tobacco: Never Used    Tobacco comment: in process of quitting    Vaping Use    Vaping Use: Never used   Substance and Sexual Activity    Alcohol use: Yes     Alcohol/week: 0.0 standard drinks     Comment: occ    Drug use: No    Sexual activity: Yes     Partners: Male   Other Topics Concern    Not on file   Social History Narrative    Not on file     Social Determinants of Health     Financial Resource Strain:     Difficulty of Paying Living Expenses:    Food Insecurity:     Worried About Running Out of Food in the Last Year:     [address] in the Last Year:    Transportation Needs:     Lack of Transportation (Medical):  Lack of Transportation (Non-Medical):    Physical Activity:     Days of Exercise per Week:     Minutes of Exercise per Session:    Stress:     Feeling of Stress :    Social Connections:     Frequency of Communication with Friends and Family:     Frequency of Social Gatherings with Friends and Family:     Attends [employer] Services:     Active Member of Clubs or Organizations:     Attends Club or Organization Meetings:     Marital Status:    Intimate Partner Violence:     Fear of Current or Ex-Partner:     Emotionally Abused:     Physically Abused:     Sexually Abused:      No current facility-administered medications for this encounter.      Current Outpatient Medications   Medication Sig Dispense Refill    methylPREDNISolone (MEDROL DOSEPACK) 4 MG tablet Take by mouth. 1 kit 0    methocarbamol (ROBAXIN) 500 MG tablet Take 1 tablet by mouth 4 times daily for 10 days 40 tablet 0    anastrozole (ARIMIDEX) 1 MG tablet 1 mg      cephALEXin (KEFLEX) 500 MG capsule Take 1 capsule by mouth 2 times daily 28 capsule 0    loratadine (CLARITIN) 10 MG tablet Take 10 mg by mouth daily      vitamin B-6 (PYRIDOXINE) 100 MG tablet Take 100 mg by mouth daily      Calcium Carb-Cholecalciferol (CALCIUM-VITAMIN D) 500-200 MG-UNIT per tablet Take 1 tablet by mouth 2 times daily (with meals)      senna (SENOKOT) 8.6 MG tablet Take 1 tablet by mouth daily      Multiple Vitamins-Minerals (THERAPEUTIC MULTIVITAMIN-MINERALS) tablet Take 1 tablet by mouth daily      Ascorbic Acid (VITAMIN C) 250 MG tablet Take 250 mg by mouth daily      acetaminophen (TYLENOL) 500 MG tablet Take 500 mg by mouth every 6 hours as needed for Pain       Allergies   Allergen Reactions    No Known Allergies      10/24/2018 -     04/20/2015 -         REVIEW OF SYSTEMS  10 systems reviewed, pertinent positives per HPI otherwise noted to be negative. PHYSICAL EXAM  BP (!) 116/55   Pulse 70   Temp 98.9 °F (37.2 °C) (Oral)   Resp 18   Ht 5' 8\" (1.727 m)   Wt 150 lb (68 kg)   LMP 10/01/2019 (Approximate)   SpO2 98%   BMI 22.81 kg/m²   GENERAL APPEARANCE: Awake and alert. Cooperative. No acute distress. HEAD: Normocephalic. Atraumatic. There is no midline C-spine tenderness, there is mild left-sided trapezius muscle pain to palpation  EYES: PERRL. EOM's grossly intact. ENT: Mucous membranes are moist.   NECK: Supple, trachea midline. No significant lymphadenopathy  HEART: RRR. No harsh murmurs. Intact radial pulses 2+ bilaterally. LUNGS: Respirations unlabored without accessory muscle use. Speaking comfortably in full sentences. ABDOMEN: Soft. Non-distended. Non-tender. No guarding or rebound. EXTREMITIES: No peripheral edema. No acute deformities. There is no midline thoracic or lumbar spinal tenderness. There is no step-offs. SKIN: Warm and dry. No acute rashes. NEUROLOGICAL: Alert and oriented X 3. CN II-XII intact. No gross facial drooping. Strength in the bilateral lower extremities is 4+ out of 5. She is able to lift the bilateral lower extremities off the bed. She has tach sensation to light touch bilaterally. She has 5 out of 5 strength in the large toe dorsi flexion, plantar flexion. PSYCHIATRIC: Normal mood and affect. LABS  I have reviewed all labs for this visit.    Results for orders placed or performed during the hospital encounter of 09/30/21   CBC Auto Differential   Result Value Ref Range    WBC 3.5 (L) 4.0 - 11.0 K/uL    RBC 3.20 (L) 4.00 - 5.20 M/uL    Hemoglobin 13.5 12.0 - 16.0 g/dL    Hematocrit 38.2 36.0 - 48.0 %    .3 (H) 80.0 - 100.0 fL    MCH 42.1 (H) 26.0 - 34.0 pg    MCHC 35.3 31.0 - 36.0 g/dL    RDW 12.9 12.4 - 15.4 %    Platelets 817 117 - 545 K/uL    MPV 5.9 5.0 - 10.5 fL    PLATELET SLIDE REVIEW Adequate     SLIDE REVIEW see below     Path Consult Yes     Neutrophils % 76.0 %    Lymphocytes % 16.4 %    Monocytes % 4.2 %    Eosinophils % 2.1 %    Basophils % 1.3 %    Neutrophils Absolute 2.7 1.7 - 7.7 K/uL    Lymphocytes Absolute 0.6 (L) 1.0 - 5.1 K/uL    Monocytes Absolute 0.2 0.0 - 1.3 K/uL    Eosinophils Absolute 0.1 0.0 - 0.6 K/uL    Basophils Absolute 0.0 0.0 - 0.2 K/uL    Anisocytosis 1+ (A)     Macrocytes Occasional (A)     Microcytes Occasional (A)     Polychromasia Occasional (A)     Poikilocytes Occasional (A)     Ovalocytes Occasional (A)    Comprehensive Metabolic Panel w/ Reflex to MG   Result Value Ref Range    Sodium 141 136 - 145 mmol/L    Potassium reflex Magnesium 4.1 3.5 - 5.1 mmol/L    Chloride 102 99 - 110 mmol/L    CO2 27 21 - 32 mmol/L    Anion Gap 12 3 - 16    Glucose 93 70 - 99 mg/dL    BUN 9 7 - 20 mg/dL    CREATININE 0.6 0.6 - 1.1 mg/dL    GFR Non-African American >60 >60    GFR African American >60 >60    Calcium 9.7 8.3 - 10.6 mg/dL    Total Protein 7.9 6.4 - 8.2 g/dL    Albumin 5.2 (H) 3.4 - 5.0 g/dL    Albumin/Globulin Ratio 1.9 1.1 - 2.2    Total Bilirubin 0.4 0.0 - 1.0 mg/dL    Alkaline Phosphatase 66 40 - 129 U/L    ALT 14 10 - 40 U/L    AST 19 15 - 37 U/L    Globulin 2.7 g/dL             RADIOLOGY  X-RAYS:  I have reviewed radiologic plain film image(s). ALL OTHER NON-PLAIN FILM IMAGES SUCH AS CT, ULTRASOUND AND MRI HAVE BEEN READ BY THE RADIOLOGIST. MRI LUMBAR SPINE WO CONTRAST   Final Result      Modic changes of the endplates at L5-S2 central disc herniation with   extrusion of disc material posterior to S1 new from prior study. No evidence for nerve root impingement however. Normal appearance of the cauda equina         CT CHEST ABDOMEN PELVIS W CONTRAST   Final Result   No acute intrathoracic, intra-abdominal, or intrapelvic abnormality. Osseous metastatic disease, stable in appearance. CT LUMBAR SPINE TRAUMA RECONSTRUCTION   Final Result   No acute osseous abnormality. Stable mixed lytic and blastic metastatic lesions when compared to the prior   CT of 08/04/2021         CT THORACIC SPINE TRAUMA RECONSTRUCTION   Final Result   Irregular predominantly sclerotic metastasis seen at T4 through T9 similar to   prior exam.      No evidence for acute compression fracture thoracic spine. Mild multilevel degenerative changes mid to lower thoracic spine. CT CERVICAL SPINE WO CONTRAST   Final Result   No acute abnormality of the cervical spine. ED COURSE/MDM  Patient seen and evaluated. Old records reviewed. Labs and imaging reviewed and results discussed with patient. This is a 71-year-old female who presents for evaluation of increased low back pain, bilateral lower extremity weakness. Her history of metastatic breast cancer is concerning for spinal metastasis, potential spinal epidural compression syndrome. She has subjectively decreased strength in the bilateral lower extremities and 4+ out of 5 strength in bilateral lower extremities. She has intact sensation otherwise no additional symptoms that are concerning for spinal epidural compression syndrome. Because of her risk factors, she will undergo MRI of the lumbar spine to evaluate for potential spinal epidural compression syndrome. We will also obtain basic labs, CT of the chest abdomen pelvis with spinal reformats as patient is due for this for her oncologic treatment and surveillance. She will be given codon, oral Zofran for initial management of her symptoms while diagnostic work-up is pending. CT of the chest abdomen pelvis shows stable metastatic disease in no acute compression fractures or pathologic fractures. MRI of the lumbar spine reveals disc bulge at L5-S1 that likely explains her symptoms of sciatica. She has no findings to suggest spinal epidural compression syndrome. The imaging results was reviewed with the patient and she was advised on symptomatic management for sciatica including steroid course, Robaxin, oxycodone, lidocaine patches. She was advised on return precautions and follow-up with her oncologist.  She expressed understanding. The patient will be discharged from the emergency department. The patient was counseled on their diagnosis and any medications prescribed. They were advised on the need for PCP followup. They were counseled on the need to return to the emergency department if any of their symptoms were to worsen, change or have any other concerns. Discharged in stable condition. Patient was given scripts for the following medications. I counseled patient how to take these medications. New Prescriptions    METHOCARBAMOL (ROBAXIN) 500 MG TABLET    Take 1 tablet by mouth 4 times daily for 10 days    METHYLPREDNISOLONE (MEDROL DOSEPACK) 4 MG TABLET    Take by mouth. CLINICAL IMPRESSION  1.  Sciatica of left side        Blood pressure (!) 116/55, pulse 70, temperature 98.9 °F (37.2 °C), temperature source Oral, resp. rate 18, height 5' 8\" (1.727 m), weight 150 lb (68 kg), last menstrual period 10/01/2019, SpO2 98 %, unknown if currently breastfeeding. DISPOSITION  Brittani Calderon was discharged to home in stable condition. This chart was generated in part by using Dragon Dictation system and may contain errors related to that system including errors in grammar, punctuation, and spelling, as well as words and phrases that may be inappropriate. If there are any questions or concerns please feel free to contact the dictating provider for clarification.      Jennifer Chase MD  09/30/21 [id number]

## 2021-09-30 NOTE — ED NOTES
Called oncology @ [id number]  Re: Mayar Bobby @ [id number] [facility]  09/30/21 [id number]

## 2021-10-01 LAB — HEMATOLOGY PATH CONSULT: NORMAL

## 2021-10-21 ENCOUNTER — HOSPITAL ENCOUNTER (OUTPATIENT)
Dept: NUCLEAR MEDICINE | Age: 36
Discharge: HOME | End: 2021-10-21
Payer: COMMERCIAL

## 2021-10-21 DIAGNOSIS — C79.51 SECONDARY MALIGNANT NEOPLASM OF BONE (HCC): ICD-10-CM

## 2021-10-21 DIAGNOSIS — C50.512 MALIGNANT NEOPLASM OF LOWER-OUTER QUADRANT OF LEFT FEMALE BREAST, UNSPECIFIED ESTROGEN RECEPTOR STATUS (HCC): ICD-10-CM

## 2021-10-21 PROCEDURE — 78306 BONE IMAGING WHOLE BODY: CPT

## 2021-10-21 PROCEDURE — A9503 TC99M MEDRONATE: HCPCS | Performed by: INTERNAL MEDICINE

## 2021-10-21 PROCEDURE — 3430000000 HC RX DIAGNOSTIC RADIOPHARMACEUTICAL: Performed by: INTERNAL MEDICINE

## 2021-10-21 RX ORDER — TC 99M MEDRONATE 20 MG/10ML
25.1 INJECTION, POWDER, LYOPHILIZED, FOR SOLUTION INTRAVENOUS
Status: COMPLETED | OUTPATIENT
Start: 2021-10-21 | End: 2021-10-21

## 2021-10-21 RX ADMIN — TC 99M MEDRONATE 25.1 MILLICURIE: 20 INJECTION, POWDER, LYOPHILIZED, FOR SOLUTION INTRAVENOUS at 09:03

## 2021-12-16 ENCOUNTER — HOSPITAL ENCOUNTER (OUTPATIENT)
Dept: CT IMAGING | Age: 36
Discharge: HOME | End: 2021-12-16
Payer: COMMERCIAL

## 2021-12-16 ENCOUNTER — HOSPITAL ENCOUNTER (OUTPATIENT)
Dept: NUCLEAR MEDICINE | Age: 36
Discharge: HOME | End: 2021-12-16
Payer: COMMERCIAL

## 2021-12-16 DIAGNOSIS — C50.512 MALIGNANT NEOPLASM OF LOWER-OUTER QUADRANT OF LEFT FEMALE BREAST, UNSPECIFIED ESTROGEN RECEPTOR STATUS (HCC): ICD-10-CM

## 2021-12-16 PROCEDURE — 74177 CT ABD & PELVIS W/CONTRAST: CPT

## 2021-12-16 PROCEDURE — 3430000000 HC RX DIAGNOSTIC RADIOPHARMACEUTICAL: Performed by: INTERNAL MEDICINE

## 2021-12-16 PROCEDURE — 6360000004 HC RX CONTRAST MEDICATION: Performed by: INTERNAL MEDICINE

## 2021-12-16 PROCEDURE — 78306 BONE IMAGING WHOLE BODY: CPT

## 2021-12-16 PROCEDURE — A9503 TC99M MEDRONATE: HCPCS | Performed by: INTERNAL MEDICINE

## 2021-12-16 RX ORDER — TC 99M MEDRONATE 20 MG/10ML
25.6 INJECTION, POWDER, LYOPHILIZED, FOR SOLUTION INTRAVENOUS
Status: COMPLETED | OUTPATIENT
Start: 2021-12-16 | End: 2021-12-16

## 2021-12-16 RX ADMIN — TC 99M MEDRONATE 25.6 MILLICURIE: 20 INJECTION, POWDER, LYOPHILIZED, FOR SOLUTION INTRAVENOUS at 10:16

## 2021-12-16 RX ADMIN — IOHEXOL 50 ML: 240 INJECTION, SOLUTION INTRATHECAL; INTRAVASCULAR; INTRAVENOUS; ORAL at 11:02

## 2021-12-16 RX ADMIN — IOPAMIDOL 100 ML: 755 INJECTION, SOLUTION INTRAVENOUS at 11:01

## 2021-12-23 ENCOUNTER — HOSPITAL ENCOUNTER (OUTPATIENT)
Dept: MRI IMAGING | Age: 36
Discharge: HOME | End: 2021-12-23
Payer: COMMERCIAL

## 2021-12-23 DIAGNOSIS — C50.412 MALIGNANT NEOPLASM OF UPPER-OUTER QUADRANT OF LEFT FEMALE BREAST, UNSPECIFIED ESTROGEN RECEPTOR STATUS (HCC): ICD-10-CM

## 2021-12-23 PROCEDURE — 6360000004 HC RX CONTRAST MEDICATION: Performed by: INTERNAL MEDICINE

## 2021-12-23 PROCEDURE — 72157 MRI CHEST SPINE W/O & W/DYE: CPT

## 2021-12-23 PROCEDURE — A9579 GAD-BASE MR CONTRAST NOS,1ML: HCPCS | Performed by: INTERNAL MEDICINE

## 2021-12-23 RX ADMIN — GADOTERIDOL 13 ML: 279.3 INJECTION, SOLUTION INTRAVENOUS at 09:36

## 2022-03-10 ENCOUNTER — HOSPITAL ENCOUNTER (OUTPATIENT)
Dept: CT IMAGING | Age: 37
Discharge: HOME | End: 2022-03-10
Payer: COMMERCIAL

## 2022-03-10 ENCOUNTER — HOSPITAL ENCOUNTER (OUTPATIENT)
Dept: NUCLEAR MEDICINE | Age: 37
Discharge: HOME | End: 2022-03-10
Payer: COMMERCIAL

## 2022-03-10 DIAGNOSIS — C79.51 SECONDARY CANCER OF BONE (HCC): ICD-10-CM

## 2022-03-10 DIAGNOSIS — C50.412 MALIGNANT NEOPLASM OF UPPER-OUTER QUADRANT OF LEFT FEMALE BREAST, UNSPECIFIED ESTROGEN RECEPTOR STATUS (HCC): ICD-10-CM

## 2022-03-10 PROCEDURE — 74177 CT ABD & PELVIS W/CONTRAST: CPT

## 2022-03-10 PROCEDURE — A9503 TC99M MEDRONATE: HCPCS | Performed by: NURSE PRACTITIONER

## 2022-03-10 PROCEDURE — 78306 BONE IMAGING WHOLE BODY: CPT

## 2022-03-10 PROCEDURE — 3430000000 HC RX DIAGNOSTIC RADIOPHARMACEUTICAL: Performed by: NURSE PRACTITIONER

## 2022-03-10 PROCEDURE — 6360000004 HC RX CONTRAST MEDICATION: Performed by: NURSE PRACTITIONER

## 2022-03-10 RX ORDER — TC 99M MEDRONATE 20 MG/10ML
27.5 INJECTION, POWDER, LYOPHILIZED, FOR SOLUTION INTRAVENOUS
Status: COMPLETED | OUTPATIENT
Start: 2022-03-10 | End: 2022-03-10

## 2022-03-10 RX ADMIN — IOPAMIDOL 100 ML: 755 INJECTION, SOLUTION INTRAVENOUS at 10:26

## 2022-03-10 RX ADMIN — IOHEXOL 50 ML: 240 INJECTION, SOLUTION INTRATHECAL; INTRAVASCULAR; INTRAVENOUS; ORAL at 10:26

## 2022-03-10 RX ADMIN — TC 99M MEDRONATE 27.5 MILLICURIE: 20 INJECTION, POWDER, LYOPHILIZED, FOR SOLUTION INTRAVENOUS at 09:16

## 2022-03-25 ENCOUNTER — HOSPITAL ENCOUNTER (OUTPATIENT)
Dept: MRI IMAGING | Age: 37
Discharge: HOME | End: 2022-03-25
Payer: COMMERCIAL

## 2022-03-25 DIAGNOSIS — C50.412 MALIGNANT NEOPLASM OF UPPER-OUTER QUADRANT OF LEFT FEMALE BREAST, UNSPECIFIED ESTROGEN RECEPTOR STATUS (HCC): ICD-10-CM

## 2022-03-25 PROCEDURE — 6360000004 HC RX CONTRAST MEDICATION: Performed by: INTERNAL MEDICINE

## 2022-03-25 PROCEDURE — A9579 GAD-BASE MR CONTRAST NOS,1ML: HCPCS | Performed by: INTERNAL MEDICINE

## 2022-03-25 PROCEDURE — 72158 MRI LUMBAR SPINE W/O & W/DYE: CPT

## 2022-03-25 RX ADMIN — GADOTERIDOL 13 ML: 279.3 INJECTION, SOLUTION INTRAVENOUS at 10:19

## 2022-08-02 ENCOUNTER — HOSPITAL ENCOUNTER (OUTPATIENT)
Dept: NUCLEAR MEDICINE | Age: 37
Discharge: HOME | End: 2022-08-02
Payer: COMMERCIAL

## 2022-08-02 ENCOUNTER — HOSPITAL ENCOUNTER (OUTPATIENT)
Dept: CT IMAGING | Age: 37
Discharge: HOME | End: 2022-08-02
Payer: COMMERCIAL

## 2022-08-02 DIAGNOSIS — C50.412 MALIGNANT NEOPLASM OF UPPER-OUTER QUADRANT OF LEFT FEMALE BREAST, UNSPECIFIED ESTROGEN RECEPTOR STATUS (HCC): ICD-10-CM

## 2022-08-02 PROCEDURE — 6360000004 HC RX CONTRAST MEDICATION: Performed by: INTERNAL MEDICINE

## 2022-08-02 PROCEDURE — 78306 BONE IMAGING WHOLE BODY: CPT | Performed by: INTERNAL MEDICINE

## 2022-08-02 PROCEDURE — 3430000000 HC RX DIAGNOSTIC RADIOPHARMACEUTICAL: Performed by: INTERNAL MEDICINE

## 2022-08-02 PROCEDURE — 74177 CT ABD & PELVIS W/CONTRAST: CPT

## 2022-08-02 PROCEDURE — A9503 TC99M MEDRONATE: HCPCS | Performed by: INTERNAL MEDICINE

## 2022-08-02 RX ORDER — TC 99M MEDRONATE 20 MG/10ML
26.1 INJECTION, POWDER, LYOPHILIZED, FOR SOLUTION INTRAVENOUS
Status: COMPLETED | OUTPATIENT
Start: 2022-08-02 | End: 2022-08-02

## 2022-08-02 RX ADMIN — IOHEXOL 50 ML: 240 INJECTION, SOLUTION INTRATHECAL; INTRAVASCULAR; INTRAVENOUS; ORAL at 09:11

## 2022-08-02 RX ADMIN — TC 99M MEDRONATE 26.1 MILLICURIE: 20 INJECTION, POWDER, LYOPHILIZED, FOR SOLUTION INTRAVENOUS at 08:17

## 2022-08-02 RX ADMIN — IOPAMIDOL 100 ML: 755 INJECTION, SOLUTION INTRAVENOUS at 09:12

## 2022-09-14 ENCOUNTER — HOSPITAL ENCOUNTER (OUTPATIENT)
Dept: VASCULAR LAB | Age: 37
Discharge: HOME | End: 2022-09-14
Payer: COMMERCIAL

## 2022-09-14 DIAGNOSIS — R22.41 LOCALIZED SWELLING, MASS AND LUMP, LOWER LIMB, RIGHT: ICD-10-CM

## 2022-09-14 PROCEDURE — 93971 EXTREMITY STUDY: CPT

## 2022-09-20 ENCOUNTER — HOSPITAL ENCOUNTER (OUTPATIENT)
Dept: GENERAL RADIOLOGY | Age: 37
Discharge: HOME | End: 2022-09-20
Payer: COMMERCIAL

## 2022-09-20 ENCOUNTER — HOSPITAL ENCOUNTER (OUTPATIENT)
Age: 37
Discharge: HOME | End: 2022-09-20
Payer: COMMERCIAL

## 2022-09-20 DIAGNOSIS — C50.919 MALIGNANT NEOPLASM OF FEMALE BREAST, UNSPECIFIED ESTROGEN RECEPTOR STATUS, UNSPECIFIED LATERALITY, UNSPECIFIED SITE OF BREAST (HCC): ICD-10-CM

## 2022-09-20 PROCEDURE — 73560 X-RAY EXAM OF KNEE 1 OR 2: CPT

## 2022-09-29 ENCOUNTER — HOSPITAL ENCOUNTER (OUTPATIENT)
Dept: MRI IMAGING | Age: 37
Discharge: HOME | End: 2022-09-29
Payer: COMMERCIAL

## 2022-09-29 ENCOUNTER — APPOINTMENT (OUTPATIENT)
Dept: MRI IMAGING | Age: 37
End: 2022-09-29
Payer: COMMERCIAL

## 2022-09-29 DIAGNOSIS — C50.512 MALIGNANT NEOPLASM OF LOWER-OUTER QUADRANT OF LEFT FEMALE BREAST, UNSPECIFIED ESTROGEN RECEPTOR STATUS (HCC): ICD-10-CM

## 2022-09-29 DIAGNOSIS — R22.41 LOCALIZED SWELLING, MASS AND LUMP, LOWER LIMB, RIGHT: ICD-10-CM

## 2022-09-29 PROCEDURE — 73721 MRI JNT OF LWR EXTRE W/O DYE: CPT

## 2022-10-25 ENCOUNTER — HOSPITAL ENCOUNTER (OUTPATIENT)
Dept: CT IMAGING | Age: 37
Discharge: HOME | End: 2022-10-25
Payer: COMMERCIAL

## 2022-10-25 ENCOUNTER — HOSPITAL ENCOUNTER (OUTPATIENT)
Dept: NUCLEAR MEDICINE | Age: 37
Discharge: HOME | End: 2022-10-25
Payer: COMMERCIAL

## 2022-10-25 DIAGNOSIS — C50.412 MALIGNANT NEOPLASM OF UPPER-OUTER QUADRANT OF LEFT FEMALE BREAST, UNSPECIFIED ESTROGEN RECEPTOR STATUS (HCC): ICD-10-CM

## 2022-10-25 PROCEDURE — 71260 CT THORAX DX C+: CPT

## 2022-10-25 PROCEDURE — 3430000000 HC RX DIAGNOSTIC RADIOPHARMACEUTICAL: Performed by: INTERNAL MEDICINE

## 2022-10-25 PROCEDURE — 6360000004 HC RX CONTRAST MEDICATION: Performed by: INTERNAL MEDICINE

## 2022-10-25 PROCEDURE — A9503 TC99M MEDRONATE: HCPCS | Performed by: INTERNAL MEDICINE

## 2022-10-25 PROCEDURE — 78306 BONE IMAGING WHOLE BODY: CPT | Performed by: INTERNAL MEDICINE

## 2022-10-25 RX ORDER — TC 99M MEDRONATE 20 MG/10ML
25 INJECTION, POWDER, LYOPHILIZED, FOR SOLUTION INTRAVENOUS
Status: COMPLETED | OUTPATIENT
Start: 2022-10-25 | End: 2022-10-25

## 2022-10-25 RX ADMIN — DIATRIZOATE MEGLUMINE AND DIATRIZOATE SODIUM 12 ML: 660; 100 LIQUID ORAL; RECTAL at 10:00

## 2022-10-25 RX ADMIN — TC 99M MEDRONATE 25 MILLICURIE: 20 INJECTION, POWDER, LYOPHILIZED, FOR SOLUTION INTRAVENOUS at 09:19

## 2022-10-25 RX ADMIN — IOPAMIDOL 75 ML: 755 INJECTION, SOLUTION INTRAVENOUS at 09:55

## 2022-11-02 ENCOUNTER — HOSPITAL ENCOUNTER (OUTPATIENT)
Dept: MRI IMAGING | Age: 37
Discharge: HOME | End: 2022-11-02
Payer: COMMERCIAL

## 2022-11-02 DIAGNOSIS — C50.412 MALIGNANT NEOPLASM OF UPPER-OUTER QUADRANT OF LEFT FEMALE BREAST, UNSPECIFIED ESTROGEN RECEPTOR STATUS (HCC): ICD-10-CM

## 2022-11-02 PROCEDURE — 6360000004 HC RX CONTRAST MEDICATION: Performed by: INTERNAL MEDICINE

## 2022-11-02 PROCEDURE — A9579 GAD-BASE MR CONTRAST NOS,1ML: HCPCS | Performed by: INTERNAL MEDICINE

## 2022-11-02 PROCEDURE — 73723 MRI JOINT LWR EXTR W/O&W/DYE: CPT

## 2022-11-02 RX ADMIN — GADOTERIDOL 14 ML: 279.3 INJECTION, SOLUTION INTRAVENOUS at 12:39

## 2022-12-06 ENCOUNTER — OFFICE VISIT (OUTPATIENT)
Dept: URGENT CARE | Age: 37
End: 2022-12-06

## 2022-12-06 VITALS
BODY MASS INDEX: 21.98 KG/M2 | HEART RATE: 103 BPM | TEMPERATURE: 99.6 F | OXYGEN SATURATION: 96 % | DIASTOLIC BLOOD PRESSURE: 62 MMHG | WEIGHT: 145 LBS | SYSTOLIC BLOOD PRESSURE: 92 MMHG | HEIGHT: 68 IN

## 2022-12-06 DIAGNOSIS — J06.9 ACUTE URI: Primary | ICD-10-CM

## 2022-12-06 RX ORDER — DEXTROMETHORPHAN HYDROBROMIDE AND PROMETHAZINE HYDROCHLORIDE 15; 6.25 MG/5ML; MG/5ML
5 SYRUP ORAL 4 TIMES DAILY PRN
Qty: 150 ML | Refills: 0 | Status: SHIPPED | OUTPATIENT
Start: 2022-12-06 | End: 2022-12-16

## 2022-12-06 RX ORDER — CAPECITABINE 500 MG/1
1250 TABLET, FILM COATED ORAL 2 TIMES DAILY
COMMUNITY

## 2022-12-06 RX ORDER — OXYCODONE HYDROCHLORIDE AND ACETAMINOPHEN 5; 325 MG/1; MG/1
1 TABLET ORAL EVERY 4 HOURS PRN
COMMUNITY

## 2022-12-06 RX ORDER — VENLAFAXINE 37.5 MG/1
37.5 TABLET ORAL 3 TIMES DAILY
COMMUNITY

## 2022-12-06 NOTE — PROGRESS NOTES
Tonja Scott (:  1985) is a 40 y.o. female,New patient, here for evaluation of the following chief complaint(s):  Fever and Influenza         ASSESSMENT/PLAN:  1. Acute URI  -     promethazine-dextromethorphan (PROMETHAZINE-DM) 6.25-15 MG/5ML syrup; Take 5 mLs by mouth 4 times daily as needed for Cough, Disp-150 mL, R-0Normal      Return in about 1 day (around 2022) for Oncology. Subjective   SUBJECTIVE/OBJECTIVE:  Patient was instructed to come by her oncologist for flu test. Patient has had fevers, malaise, cough and congestion. Patient denies any ill contacts, has not recently traveled. Patient declined Covid test. OTC cough medications not helping, patient is having trouble sleeping. History provided by:  Patient   used: No    Fever   Associated symptoms include congestion and coughing. Pertinent negatives include no sore throat. Influenza  Associated symptoms include congestion, coughing, fatigue and a fever. Pertinent negatives include no sore throat. Review of Systems   Constitutional:  Positive for fatigue and fever. HENT:  Positive for congestion. Negative for rhinorrhea, sinus pressure and sore throat. Respiratory:  Positive for cough. Negative for shortness of breath. All other systems reviewed and are negative. Objective   Physical Exam  Vitals reviewed. Constitutional:       General: She is not in acute distress. Appearance: Normal appearance. She is not toxic-appearing. HENT:      Head: Normocephalic and atraumatic. Right Ear: Tympanic membrane and ear canal normal.      Left Ear: Tympanic membrane and ear canal normal.      Nose: Congestion present. No rhinorrhea. Mouth/Throat:      Mouth: Mucous membranes are moist.      Pharynx: No oropharyngeal exudate or posterior oropharyngeal erythema. Eyes:      Extraocular Movements: Extraocular movements intact.       Conjunctiva/sclera: Conjunctivae normal.      Pupils: Pupils are equal, round, and reactive to light. Cardiovascular:      Rate and Rhythm: Normal rate and regular rhythm. Pulses: Normal pulses. Heart sounds: Normal heart sounds. No murmur heard. No friction rub. No gallop. Pulmonary:      Effort: Pulmonary effort is normal.      Breath sounds: Normal breath sounds. No wheezing. Abdominal:      General: Abdomen is flat. Palpations: Abdomen is soft. Musculoskeletal:      Cervical back: Normal range of motion and neck supple. Lymphadenopathy:      Cervical: No cervical adenopathy. Skin:     General: Skin is warm and dry. Capillary Refill: Capillary refill takes less than 2 seconds. Neurological:      Mental Status: She is alert and oriented to person, place, and time. An electronic signature was used to authenticate this note.     --DANIELITO Soto - CNP

## 2022-12-06 NOTE — PATIENT INSTRUCTIONS
Flu test was negative  Promethazine cough syrup 4 times daily as needed  May cause drowsiness do not drink or operate machinery  Follow up with oncology

## 2022-12-07 ASSESSMENT — ENCOUNTER SYMPTOMS
SORE THROAT: 0
SINUS PRESSURE: 0
RHINORRHEA: 0
COUGH: 1
FLU SYMPTOMS: 1
SHORTNESS OF BREATH: 0

## 2023-01-19 ENCOUNTER — HOSPITAL ENCOUNTER (OUTPATIENT)
Dept: GENERAL RADIOLOGY | Age: 38
Discharge: HOME | End: 2023-01-19

## 2023-01-19 ENCOUNTER — HOSPITAL ENCOUNTER (OUTPATIENT)
Age: 38
Discharge: HOME | End: 2023-01-19

## 2023-01-19 DIAGNOSIS — M25.552 LEFT HIP PAIN: ICD-10-CM

## 2023-01-19 DIAGNOSIS — C50.412 MALIGNANT NEOPLASM OF UPPER-OUTER QUADRANT OF LEFT FEMALE BREAST, UNSPECIFIED ESTROGEN RECEPTOR STATUS (HCC): ICD-10-CM

## 2023-01-19 DIAGNOSIS — C79.51 SECONDARY MALIGNANT NEOPLASM OF BONE AND BONE MARROW (HCC): ICD-10-CM

## 2023-01-19 DIAGNOSIS — C79.52 SECONDARY MALIGNANT NEOPLASM OF BONE AND BONE MARROW (HCC): ICD-10-CM

## 2023-01-19 PROCEDURE — 73502 X-RAY EXAM HIP UNI 2-3 VIEWS: CPT

## 2023-01-19 PROCEDURE — 73552 X-RAY EXAM OF FEMUR 2/>: CPT

## 2023-02-17 ENCOUNTER — HOSPITAL ENCOUNTER (OUTPATIENT)
Dept: MRI IMAGING | Age: 38
Discharge: HOME | End: 2023-02-17
Payer: COMMERCIAL

## 2023-02-17 DIAGNOSIS — C50.412 MALIGNANT NEOPLASM OF UPPER-OUTER QUADRANT OF LEFT FEMALE BREAST, UNSPECIFIED ESTROGEN RECEPTOR STATUS (HCC): ICD-10-CM

## 2023-02-17 PROCEDURE — 72158 MRI LUMBAR SPINE W/O & W/DYE: CPT

## 2023-02-17 PROCEDURE — 6360000004 HC RX CONTRAST MEDICATION: Performed by: RADIOLOGY

## 2023-02-17 PROCEDURE — A9579 GAD-BASE MR CONTRAST NOS,1ML: HCPCS | Performed by: RADIOLOGY

## 2023-02-17 RX ADMIN — GADOTERIDOL 14 ML: 279.3 INJECTION, SOLUTION INTRAVENOUS at 13:49

## 2023-08-09 ENCOUNTER — HOSPITAL ENCOUNTER (OUTPATIENT)
Dept: CT IMAGING | Age: 38
Discharge: HOME | End: 2023-08-09
Attending: INTERNAL MEDICINE
Payer: COMMERCIAL

## 2023-08-09 ENCOUNTER — HOSPITAL ENCOUNTER (OUTPATIENT)
Dept: NUCLEAR MEDICINE | Age: 38
Discharge: HOME | End: 2023-08-09
Attending: INTERNAL MEDICINE
Payer: COMMERCIAL

## 2023-08-09 DIAGNOSIS — C79.51 METASTASIS TO BONE (HCC): ICD-10-CM

## 2023-08-09 DIAGNOSIS — C50.412 MALIGNANT NEOPLASM OF UPPER-OUTER QUADRANT OF LEFT FEMALE BREAST, UNSPECIFIED ESTROGEN RECEPTOR STATUS (HCC): ICD-10-CM

## 2023-08-09 PROCEDURE — 3430000000 HC RX DIAGNOSTIC RADIOPHARMACEUTICAL: Performed by: INTERNAL MEDICINE

## 2023-08-09 PROCEDURE — 78306 BONE IMAGING WHOLE BODY: CPT | Performed by: INTERNAL MEDICINE

## 2023-08-09 PROCEDURE — 6360000004 HC RX CONTRAST MEDICATION: Performed by: INTERNAL MEDICINE

## 2023-08-09 PROCEDURE — A9503 TC99M MEDRONATE: HCPCS | Performed by: INTERNAL MEDICINE

## 2023-08-09 PROCEDURE — 71260 CT THORAX DX C+: CPT

## 2023-08-09 RX ORDER — TC 99M MEDRONATE 20 MG/10ML
26.5 INJECTION, POWDER, LYOPHILIZED, FOR SOLUTION INTRAVENOUS
Status: COMPLETED | OUTPATIENT
Start: 2023-08-09 | End: 2023-08-09

## 2023-08-09 RX ADMIN — TC 99M MEDRONATE 26.5 MILLICURIE: 20 INJECTION, POWDER, LYOPHILIZED, FOR SOLUTION INTRAVENOUS at 10:00

## 2023-08-09 RX ADMIN — IOPAMIDOL 75 ML: 755 INJECTION, SOLUTION INTRAVENOUS at 10:09

## 2023-08-09 RX ADMIN — DIATRIZOATE MEGLUMINE AND DIATRIZOATE SODIUM 12 ML: 660; 100 LIQUID ORAL; RECTAL at 10:09

## 2023-09-26 ENCOUNTER — HOSPITAL ENCOUNTER (OUTPATIENT)
Dept: NUCLEAR MEDICINE | Age: 38
Discharge: HOME | End: 2023-09-26
Attending: INTERNAL MEDICINE
Payer: COMMERCIAL

## 2023-09-26 ENCOUNTER — HOSPITAL ENCOUNTER (OUTPATIENT)
Dept: CT IMAGING | Age: 38
Discharge: HOME | End: 2023-09-26
Attending: INTERNAL MEDICINE
Payer: COMMERCIAL

## 2023-09-26 DIAGNOSIS — C50.412 MALIGNANT NEOPLASM OF UPPER-OUTER QUADRANT OF LEFT FEMALE BREAST, UNSPECIFIED ESTROGEN RECEPTOR STATUS (HCC): ICD-10-CM

## 2023-09-26 PROCEDURE — 78306 BONE IMAGING WHOLE BODY: CPT | Performed by: INTERNAL MEDICINE

## 2023-09-26 PROCEDURE — 74177 CT ABD & PELVIS W/CONTRAST: CPT

## 2023-09-26 PROCEDURE — 6360000004 HC RX CONTRAST MEDICATION: Performed by: INTERNAL MEDICINE

## 2023-09-26 PROCEDURE — 3430000000 HC RX DIAGNOSTIC RADIOPHARMACEUTICAL: Performed by: INTERNAL MEDICINE

## 2023-09-26 PROCEDURE — A9503 TC99M MEDRONATE: HCPCS | Performed by: INTERNAL MEDICINE

## 2023-09-26 RX ORDER — TC 99M MEDRONATE 20 MG/10ML
25.6 INJECTION, POWDER, LYOPHILIZED, FOR SOLUTION INTRAVENOUS
Status: COMPLETED | OUTPATIENT
Start: 2023-09-26 | End: 2023-09-26

## 2023-09-26 RX ADMIN — IOPAMIDOL 75 ML: 755 INJECTION, SOLUTION INTRAVENOUS at 10:10

## 2023-09-26 RX ADMIN — TC 99M MEDRONATE 25.6 MILLICURIE: 20 INJECTION, POWDER, LYOPHILIZED, FOR SOLUTION INTRAVENOUS at 09:17

## 2023-09-26 RX ADMIN — DIATRIZOATE MEGLUMINE AND DIATRIZOATE SODIUM 12 ML: 660; 100 LIQUID ORAL; RECTAL at 10:11

## 2023-10-12 ENCOUNTER — HOSPITAL ENCOUNTER (OUTPATIENT)
Dept: VASCULAR LAB | Age: 38
Discharge: HOME | End: 2023-10-12
Payer: COMMERCIAL

## 2023-10-12 ENCOUNTER — HOSPITAL ENCOUNTER (OUTPATIENT)
Dept: MRI IMAGING | Age: 38
Discharge: HOME | End: 2023-10-12
Attending: INTERNAL MEDICINE
Payer: COMMERCIAL

## 2023-10-12 ENCOUNTER — HOSPITAL ENCOUNTER (OUTPATIENT)
Dept: CT IMAGING | Age: 38
Discharge: HOME | End: 2023-10-12
Attending: INTERNAL MEDICINE
Payer: COMMERCIAL

## 2023-10-12 DIAGNOSIS — C50.412 MALIGNANT NEOPLASM OF UPPER-OUTER QUADRANT OF LEFT FEMALE BREAST, UNSPECIFIED ESTROGEN RECEPTOR STATUS (HCC): ICD-10-CM

## 2023-10-12 DIAGNOSIS — R60.9 EDEMA, UNSPECIFIED TYPE: ICD-10-CM

## 2023-10-12 LAB
PERFORMED ON: NORMAL
POC CREATININE: 0.6 MG/DL (ref 0.6–1.1)
POC SAMPLE TYPE: NORMAL

## 2023-10-12 PROCEDURE — 70553 MRI BRAIN STEM W/O & W/DYE: CPT

## 2023-10-12 PROCEDURE — 82565 ASSAY OF CREATININE: CPT

## 2023-10-12 PROCEDURE — A9579 GAD-BASE MR CONTRAST NOS,1ML: HCPCS | Performed by: INTERNAL MEDICINE

## 2023-10-12 PROCEDURE — 71260 CT THORAX DX C+: CPT

## 2023-10-12 PROCEDURE — 6360000004 HC RX CONTRAST MEDICATION: Performed by: INTERNAL MEDICINE

## 2023-10-12 PROCEDURE — 93970 EXTREMITY STUDY: CPT

## 2023-10-12 RX ADMIN — GADOTERIDOL 15 ML: 279.3 INJECTION, SOLUTION INTRAVENOUS at 15:30

## 2023-10-12 RX ADMIN — IOPAMIDOL 75 ML: 755 INJECTION, SOLUTION INTRAVENOUS at 13:57

## 2023-10-27 RX ORDER — EPINEPHRINE 1 MG/ML
0.3 INJECTION, SOLUTION INTRAMUSCULAR; SUBCUTANEOUS PRN
OUTPATIENT
Start: 2023-10-27

## 2023-10-27 RX ORDER — ONDANSETRON 2 MG/ML
8 INJECTION INTRAMUSCULAR; INTRAVENOUS
OUTPATIENT
Start: 2023-10-27

## 2023-10-27 RX ORDER — SODIUM CHLORIDE 9 MG/ML
5-250 INJECTION, SOLUTION INTRAVENOUS PRN
OUTPATIENT
Start: 2023-10-27

## 2023-10-27 RX ORDER — ALBUTEROL SULFATE 90 UG/1
4 AEROSOL, METERED RESPIRATORY (INHALATION) PRN
OUTPATIENT
Start: 2023-10-27

## 2023-10-27 RX ORDER — HEPARIN 100 UNIT/ML
500 SYRINGE INTRAVENOUS PRN
OUTPATIENT
Start: 2023-10-27

## 2023-10-27 RX ORDER — ACETAMINOPHEN 325 MG/1
650 TABLET ORAL
OUTPATIENT
Start: 2023-10-27

## 2023-10-27 RX ORDER — COSYNTROPIN 0.25 MG/ML
250 INJECTION, POWDER, FOR SOLUTION INTRAMUSCULAR; INTRAVENOUS ONCE
OUTPATIENT
Start: 2023-10-27 | End: 2023-10-27

## 2023-10-27 RX ORDER — SODIUM CHLORIDE 0.9 % (FLUSH) 0.9 %
5-40 SYRINGE (ML) INJECTION PRN
OUTPATIENT
Start: 2023-10-27

## 2023-10-27 RX ORDER — SODIUM CHLORIDE 9 MG/ML
INJECTION, SOLUTION INTRAVENOUS CONTINUOUS
OUTPATIENT
Start: 2023-10-27

## 2023-10-27 RX ORDER — DIPHENHYDRAMINE HYDROCHLORIDE 50 MG/ML
50 INJECTION INTRAMUSCULAR; INTRAVENOUS
OUTPATIENT
Start: 2023-10-27

## 2023-11-02 ENCOUNTER — HOSPITAL ENCOUNTER (OUTPATIENT)
Dept: ONCOLOGY | Age: 38
Setting detail: INFUSION SERIES
Discharge: HOME | End: 2023-11-02
Payer: COMMERCIAL

## 2023-11-02 VITALS
DIASTOLIC BLOOD PRESSURE: 77 MMHG | RESPIRATION RATE: 16 BRPM | HEART RATE: 107 BPM | SYSTOLIC BLOOD PRESSURE: 119 MMHG | OXYGEN SATURATION: 94 % | TEMPERATURE: 98.3 F

## 2023-11-02 DIAGNOSIS — C50.512 MALIGNANT NEOPLASM OF LOWER-OUTER QUADRANT OF LEFT BREAST OF FEMALE, ESTROGEN RECEPTOR POSITIVE (HCC): Primary | ICD-10-CM

## 2023-11-02 DIAGNOSIS — Z17.0 MALIGNANT NEOPLASM OF LOWER-OUTER QUADRANT OF LEFT BREAST OF FEMALE, ESTROGEN RECEPTOR POSITIVE (HCC): Primary | ICD-10-CM

## 2023-11-02 LAB
CORTIS 1H P 250 UG ACTH RIV SERPL-MCNC: 19.5 UG/DL
CORTIS 30M P 250 UG ACTH RIV SERPL-MCNC: 15.9 UG/DL
CORTIS SERPL-MCNC: 8.3 UG/DL

## 2023-11-02 PROCEDURE — 6360000002 HC RX W HCPCS: Performed by: INTERNAL MEDICINE

## 2023-11-02 PROCEDURE — 96374 THER/PROPH/DIAG INJ IV PUSH: CPT

## 2023-11-02 PROCEDURE — 80400 ACTH STIMULATION PANEL: CPT

## 2023-11-02 PROCEDURE — 36591 DRAW BLOOD OFF VENOUS DEVICE: CPT

## 2023-11-02 RX ORDER — DIPHENHYDRAMINE HYDROCHLORIDE 50 MG/ML
50 INJECTION INTRAMUSCULAR; INTRAVENOUS
OUTPATIENT
Start: 2023-11-02

## 2023-11-02 RX ORDER — SODIUM CHLORIDE 9 MG/ML
INJECTION, SOLUTION INTRAVENOUS CONTINUOUS
OUTPATIENT
Start: 2023-11-02

## 2023-11-02 RX ORDER — ONDANSETRON 2 MG/ML
8 INJECTION INTRAMUSCULAR; INTRAVENOUS
OUTPATIENT
Start: 2023-11-02

## 2023-11-02 RX ORDER — COSYNTROPIN 0.25 MG/ML
250 INJECTION, POWDER, FOR SOLUTION INTRAMUSCULAR; INTRAVENOUS ONCE
Status: CANCELLED | OUTPATIENT
Start: 2023-11-02 | End: 2023-11-02

## 2023-11-02 RX ORDER — SODIUM CHLORIDE 0.9 % (FLUSH) 0.9 %
5-40 SYRINGE (ML) INJECTION PRN
OUTPATIENT
Start: 2023-11-02

## 2023-11-02 RX ORDER — COSYNTROPIN 0.25 MG/ML
250 INJECTION, POWDER, FOR SOLUTION INTRAMUSCULAR; INTRAVENOUS ONCE
Status: COMPLETED | OUTPATIENT
Start: 2023-11-02 | End: 2023-11-02

## 2023-11-02 RX ORDER — ACETAMINOPHEN 325 MG/1
650 TABLET ORAL
OUTPATIENT
Start: 2023-11-02

## 2023-11-02 RX ORDER — ALBUTEROL SULFATE 90 UG/1
4 AEROSOL, METERED RESPIRATORY (INHALATION) PRN
OUTPATIENT
Start: 2023-11-02

## 2023-11-02 RX ORDER — SODIUM CHLORIDE 9 MG/ML
5-250 INJECTION, SOLUTION INTRAVENOUS PRN
OUTPATIENT
Start: 2023-11-02

## 2023-11-02 RX ORDER — EPINEPHRINE 1 MG/ML
0.3 INJECTION, SOLUTION INTRAMUSCULAR; SUBCUTANEOUS PRN
OUTPATIENT
Start: 2023-11-02

## 2023-11-02 RX ORDER — HEPARIN 100 UNIT/ML
500 SYRINGE INTRAVENOUS PRN
OUTPATIENT
Start: 2023-11-02

## 2023-11-02 RX ADMIN — COSYNTROPIN 250 MCG: 0.25 INJECTION, POWDER, LYOPHILIZED, FOR SOLUTION INTRAMUSCULAR; INTRAVENOUS at 08:00

## 2023-11-02 NOTE — PROGRESS NOTES
Pt seen and assessed Eugenie marshall for ACTH stimulation panel per orders from Dr. Smith Rupert. Labs drawn per protocol prior to cosyntropin administration and 30 min/60 min post. Pt tolerated well and without incident. Pt verbalizes understanding of discharge instructions. Discharged ambulatory to home.

## 2023-11-07 ENCOUNTER — HOSPITAL ENCOUNTER (OUTPATIENT)
Dept: CARDIOLOGY | Age: 38
Discharge: HOME | End: 2023-11-07
Payer: COMMERCIAL

## 2023-11-07 DIAGNOSIS — C50.412 MALIGNANT NEOPLASM OF UPPER-OUTER QUADRANT OF LEFT FEMALE BREAST, UNSPECIFIED ESTROGEN RECEPTOR STATUS (HCC): ICD-10-CM

## 2023-11-07 PROCEDURE — 93306 TTE W/DOPPLER COMPLETE: CPT

## 2024-03-11 ENCOUNTER — HOSPITAL ENCOUNTER (OUTPATIENT)
Dept: NUCLEAR MEDICINE | Age: 39
Discharge: HOME | End: 2024-03-11
Attending: INTERNAL MEDICINE
Payer: COMMERCIAL

## 2024-03-11 ENCOUNTER — HOSPITAL ENCOUNTER (OUTPATIENT)
Dept: CT IMAGING | Age: 39
Discharge: HOME | End: 2024-03-11
Attending: INTERNAL MEDICINE
Payer: COMMERCIAL

## 2024-03-11 DIAGNOSIS — C50.412 MALIGNANT NEOPLASM OF UPPER-OUTER QUADRANT OF LEFT FEMALE BREAST, UNSPECIFIED ESTROGEN RECEPTOR STATUS (HCC): ICD-10-CM

## 2024-03-11 PROCEDURE — A9503 TC99M MEDRONATE: HCPCS | Performed by: INTERNAL MEDICINE

## 2024-03-11 PROCEDURE — 3430000000 HC RX DIAGNOSTIC RADIOPHARMACEUTICAL: Performed by: INTERNAL MEDICINE

## 2024-03-11 PROCEDURE — 78306 BONE IMAGING WHOLE BODY: CPT | Performed by: INTERNAL MEDICINE

## 2024-03-11 PROCEDURE — 74177 CT ABD & PELVIS W/CONTRAST: CPT

## 2024-03-11 PROCEDURE — 6360000004 HC RX CONTRAST MEDICATION: Performed by: INTERNAL MEDICINE

## 2024-03-11 RX ORDER — TC 99M MEDRONATE 20 MG/10ML
25 INJECTION, POWDER, LYOPHILIZED, FOR SOLUTION INTRAVENOUS
Status: COMPLETED | OUTPATIENT
Start: 2024-03-11 | End: 2024-03-11

## 2024-03-11 RX ADMIN — DIATRIZOATE MEGLUMINE AND DIATRIZOATE SODIUM 12 ML: 660; 100 LIQUID ORAL; RECTAL at 10:31

## 2024-03-11 RX ADMIN — TC 99M MEDRONATE 25 MILLICURIE: 20 INJECTION, POWDER, LYOPHILIZED, FOR SOLUTION INTRAVENOUS at 09:28

## 2024-03-11 RX ADMIN — IOPAMIDOL 75 ML: 755 INJECTION, SOLUTION INTRAVENOUS at 10:28

## 2024-03-11 NOTE — PROGRESS NOTES
Port accessed for CT/NM. Pt was identified using two unique identifiers. Allergies were noted. Sterile procedures followed to access port. Right sided port was accessed, aspirated and flushed approprirately. Sterile dressing applied.

## 2024-03-11 NOTE — PROGRESS NOTES
Port deaccessed for CT/NM. Pt was identified using two unique identifiers. Allergies were noted. Right sided port was deaccessed and then dry dressing was applied to the site.

## 2024-04-17 ENCOUNTER — HOSPITAL ENCOUNTER (OUTPATIENT)
Dept: CARDIOLOGY | Age: 39
Discharge: HOME | End: 2024-04-17
Attending: INTERNAL MEDICINE
Payer: COMMERCIAL

## 2024-04-17 DIAGNOSIS — Z51.11 MAINTENANCE CHEMOTHERAPY: ICD-10-CM

## 2024-04-17 PROCEDURE — 93306 TTE W/DOPPLER COMPLETE: CPT

## 2024-05-09 ENCOUNTER — HOSPITAL ENCOUNTER (OUTPATIENT)
Dept: MRI IMAGING | Age: 39
Discharge: HOME | End: 2024-05-09
Payer: COMMERCIAL

## 2024-05-09 DIAGNOSIS — Z98.1 ARTHRODESIS STATUS: ICD-10-CM

## 2024-05-09 PROCEDURE — 72148 MRI LUMBAR SPINE W/O DYE: CPT

## 2024-05-30 ENCOUNTER — HOSPITAL ENCOUNTER (OUTPATIENT)
Dept: MRI IMAGING | Age: 39
Discharge: HOME | End: 2024-05-30
Payer: COMMERCIAL

## 2024-05-30 DIAGNOSIS — Z48.89 AFTERCARE FOLLOWING SURGERY FOR INJURY AND TRAUMA: ICD-10-CM

## 2024-05-30 PROCEDURE — 6360000004 HC RX CONTRAST MEDICATION: Performed by: NEUROLOGICAL SURGERY

## 2024-05-30 PROCEDURE — A9579 GAD-BASE MR CONTRAST NOS,1ML: HCPCS | Performed by: NEUROLOGICAL SURGERY

## 2024-05-30 PROCEDURE — 73720 MRI LWR EXTREMITY W/O&W/DYE: CPT

## 2024-05-30 RX ADMIN — GADOTERIDOL 13 ML: 279.3 INJECTION, SOLUTION INTRAVENOUS at 11:11

## 2024-08-16 ENCOUNTER — HOSPITAL ENCOUNTER (OUTPATIENT)
Dept: CT IMAGING | Age: 39
Discharge: HOME | End: 2024-08-16
Payer: COMMERCIAL

## 2024-08-16 ENCOUNTER — HOSPITAL ENCOUNTER (OUTPATIENT)
Dept: NUCLEAR MEDICINE | Age: 39
Discharge: HOME | End: 2024-08-16
Payer: COMMERCIAL

## 2024-08-16 DIAGNOSIS — C50.412 MALIGNANT NEOPLASM OF UPPER-OUTER QUADRANT OF LEFT FEMALE BREAST, UNSPECIFIED ESTROGEN RECEPTOR STATUS (HCC): ICD-10-CM

## 2024-08-16 PROCEDURE — A9503 TC99M MEDRONATE: HCPCS | Performed by: NURSE PRACTITIONER

## 2024-08-16 PROCEDURE — 3430000000 HC RX DIAGNOSTIC RADIOPHARMACEUTICAL: Performed by: NURSE PRACTITIONER

## 2024-08-16 PROCEDURE — 78306 BONE IMAGING WHOLE BODY: CPT | Performed by: NURSE PRACTITIONER

## 2024-08-16 PROCEDURE — 6360000004 HC RX CONTRAST MEDICATION: Performed by: NURSE PRACTITIONER

## 2024-08-16 PROCEDURE — 74177 CT ABD & PELVIS W/CONTRAST: CPT

## 2024-08-16 RX ORDER — TC 99M MEDRONATE 20 MG/10ML
24.8 INJECTION, POWDER, LYOPHILIZED, FOR SOLUTION INTRAVENOUS ONCE
Status: COMPLETED | OUTPATIENT
Start: 2024-08-16 | End: 2024-08-16

## 2024-08-16 RX ADMIN — DIATRIZOATE MEGLUMINE AND DIATRIZOATE SODIUM 12 ML: 660; 100 LIQUID ORAL; RECTAL at 11:08

## 2024-08-16 RX ADMIN — TC 99M MEDRONATE 24.8 MILLICURIE: 20 INJECTION, POWDER, LYOPHILIZED, FOR SOLUTION INTRAVENOUS at 10:20

## 2024-08-16 RX ADMIN — IOPAMIDOL 75 ML: 755 INJECTION, SOLUTION INTRAVENOUS at 11:08

## 2024-09-24 ENCOUNTER — HOSPITAL ENCOUNTER (OUTPATIENT)
Dept: MRI IMAGING | Age: 39
Discharge: HOME | End: 2024-09-24
Attending: RADIOLOGY
Payer: COMMERCIAL

## 2024-09-24 DIAGNOSIS — R20.0 LEFT LEG NUMBNESS: ICD-10-CM

## 2024-09-24 DIAGNOSIS — M79.605 LEFT LEG PAIN: ICD-10-CM

## 2024-09-24 PROCEDURE — 72158 MRI LUMBAR SPINE W/O & W/DYE: CPT

## 2024-09-24 PROCEDURE — A9579 GAD-BASE MR CONTRAST NOS,1ML: HCPCS | Performed by: RADIOLOGY

## 2024-09-24 PROCEDURE — 6360000004 HC RX CONTRAST MEDICATION: Performed by: RADIOLOGY

## 2024-09-24 RX ADMIN — GADOTERIDOL 13 ML: 279.3 INJECTION, SOLUTION INTRAVENOUS at 10:36

## 2024-12-12 ENCOUNTER — HOSPITAL ENCOUNTER (OUTPATIENT)
Dept: NUCLEAR MEDICINE | Age: 39
Discharge: HOME | End: 2024-12-12
Attending: INTERNAL MEDICINE
Payer: COMMERCIAL

## 2024-12-12 ENCOUNTER — HOSPITAL ENCOUNTER (OUTPATIENT)
Dept: CT IMAGING | Age: 39
Discharge: HOME | End: 2024-12-12
Attending: INTERNAL MEDICINE
Payer: COMMERCIAL

## 2024-12-12 DIAGNOSIS — C50.412 MALIGNANT NEOPLASM OF UPPER-OUTER QUADRANT OF LEFT FEMALE BREAST, UNSPECIFIED ESTROGEN RECEPTOR STATUS (HCC): ICD-10-CM

## 2024-12-12 PROCEDURE — 78306 BONE IMAGING WHOLE BODY: CPT | Performed by: INTERNAL MEDICINE

## 2024-12-12 PROCEDURE — 3430000000 HC RX DIAGNOSTIC RADIOPHARMACEUTICAL: Performed by: INTERNAL MEDICINE

## 2024-12-12 PROCEDURE — 6360000004 HC RX CONTRAST MEDICATION: Performed by: INTERNAL MEDICINE

## 2024-12-12 PROCEDURE — 71260 CT THORAX DX C+: CPT

## 2024-12-12 PROCEDURE — A9503 TC99M MEDRONATE: HCPCS | Performed by: INTERNAL MEDICINE

## 2024-12-12 RX ORDER — TC 99M MEDRONATE 20 MG/10ML
26 INJECTION, POWDER, LYOPHILIZED, FOR SOLUTION INTRAVENOUS
Status: COMPLETED | OUTPATIENT
Start: 2024-12-12 | End: 2024-12-12

## 2024-12-12 RX ORDER — IOPAMIDOL 755 MG/ML
75 INJECTION, SOLUTION INTRAVASCULAR
Status: COMPLETED | OUTPATIENT
Start: 2024-12-12 | End: 2024-12-12

## 2024-12-12 RX ORDER — DIATRIZOATE MEGLUMINE AND DIATRIZOATE SODIUM 660; 100 MG/ML; MG/ML
12 SOLUTION ORAL; RECTAL
Status: DISCONTINUED | OUTPATIENT
Start: 2024-12-12 | End: 2024-12-13 | Stop reason: HOSPADM

## 2024-12-12 RX ADMIN — IOPAMIDOL 75 ML: 755 INJECTION, SOLUTION INTRAVENOUS at 08:43

## 2024-12-12 RX ADMIN — DIATRIZOATE MEGLUMINE AND DIATRIZOATE SODIUM 12 ML: 660; 100 LIQUID ORAL; RECTAL at 08:42

## 2024-12-12 RX ADMIN — TC 99M MEDRONATE 26 MILLICURIE: 20 INJECTION, POWDER, LYOPHILIZED, FOR SOLUTION INTRAVENOUS at 08:50

## 2025-05-28 ENCOUNTER — HOSPITAL ENCOUNTER (OUTPATIENT)
Dept: CT IMAGING | Age: 40
Discharge: HOME | End: 2025-05-28
Attending: INTERNAL MEDICINE
Payer: COMMERCIAL

## 2025-05-28 ENCOUNTER — HOSPITAL ENCOUNTER (OUTPATIENT)
Dept: NUCLEAR MEDICINE | Age: 40
Discharge: HOME | End: 2025-05-28
Attending: INTERNAL MEDICINE
Payer: COMMERCIAL

## 2025-05-28 DIAGNOSIS — C50.412 MALIGNANT NEOPLASM OF UPPER-OUTER QUADRANT OF LEFT FEMALE BREAST, UNSPECIFIED ESTROGEN RECEPTOR STATUS (HCC): ICD-10-CM

## 2025-05-28 PROCEDURE — 78306 BONE IMAGING WHOLE BODY: CPT | Performed by: INTERNAL MEDICINE

## 2025-05-28 PROCEDURE — 74177 CT ABD & PELVIS W/CONTRAST: CPT

## 2025-05-28 PROCEDURE — 3430000000 HC RX DIAGNOSTIC RADIOPHARMACEUTICAL: Performed by: INTERNAL MEDICINE

## 2025-05-28 PROCEDURE — A9503 TC99M MEDRONATE: HCPCS | Performed by: INTERNAL MEDICINE

## 2025-05-28 PROCEDURE — 6360000004 HC RX CONTRAST MEDICATION: Performed by: INTERNAL MEDICINE

## 2025-05-28 RX ORDER — IOPAMIDOL 755 MG/ML
75 INJECTION, SOLUTION INTRAVASCULAR
Status: COMPLETED | OUTPATIENT
Start: 2025-05-28 | End: 2025-05-28

## 2025-05-28 RX ORDER — TC 99M MEDRONATE 20 MG/10ML
28 INJECTION, POWDER, LYOPHILIZED, FOR SOLUTION INTRAVENOUS
Status: COMPLETED | OUTPATIENT
Start: 2025-05-28 | End: 2025-05-28

## 2025-05-28 RX ORDER — DIATRIZOATE MEGLUMINE AND DIATRIZOATE SODIUM 660; 100 MG/ML; MG/ML
12 SOLUTION ORAL; RECTAL
Status: DISCONTINUED | OUTPATIENT
Start: 2025-05-28 | End: 2025-05-29 | Stop reason: HOSPADM

## 2025-05-28 RX ADMIN — IOPAMIDOL 75 ML: 755 INJECTION, SOLUTION INTRAVENOUS at 12:04

## 2025-05-28 RX ADMIN — TC 99M MEDRONATE 28 MILLICURIE: 20 INJECTION, POWDER, LYOPHILIZED, FOR SOLUTION INTRAVENOUS at 11:02

## 2025-05-28 RX ADMIN — DIATRIZOATE MEGLUMINE AND DIATRIZOATE SODIUM 12 ML: 660; 100 LIQUID ORAL; RECTAL at 12:04

## 2025-08-04 ENCOUNTER — TRANSCRIBE ORDERS (OUTPATIENT)
Dept: ADMINISTRATIVE | Age: 40
End: 2025-08-04

## 2025-08-04 DIAGNOSIS — C79.52 SECONDARY MALIGNANT NEOPLASM OF BONE AND BONE MARROW (HCC): ICD-10-CM

## 2025-08-04 DIAGNOSIS — C50.412 MALIGNANT NEOPLASM OF UPPER-OUTER QUADRANT OF LEFT FEMALE BREAST, UNSPECIFIED ESTROGEN RECEPTOR STATUS (HCC): Primary | ICD-10-CM

## 2025-08-04 DIAGNOSIS — C79.51 SECONDARY MALIGNANT NEOPLASM OF BONE AND BONE MARROW (HCC): ICD-10-CM

## 2025-08-13 ENCOUNTER — HOSPITAL ENCOUNTER (OUTPATIENT)
Dept: NUCLEAR MEDICINE | Age: 40
Discharge: HOME | End: 2025-08-13
Payer: COMMERCIAL

## 2025-08-13 ENCOUNTER — HOSPITAL ENCOUNTER (OUTPATIENT)
Dept: CT IMAGING | Age: 40
Discharge: HOME | End: 2025-08-13
Payer: COMMERCIAL

## 2025-08-13 DIAGNOSIS — C79.51 METASTASIS TO BONE (HCC): ICD-10-CM

## 2025-08-13 DIAGNOSIS — C50.412 MALIGNANT NEOPLASM OF UPPER-OUTER QUADRANT OF LEFT FEMALE BREAST, UNSPECIFIED ESTROGEN RECEPTOR STATUS (HCC): ICD-10-CM

## 2025-08-13 PROCEDURE — A9503 TC99M MEDRONATE: HCPCS | Performed by: NURSE PRACTITIONER

## 2025-08-13 PROCEDURE — 74177 CT ABD & PELVIS W/CONTRAST: CPT

## 2025-08-13 PROCEDURE — 3430000000 HC RX DIAGNOSTIC RADIOPHARMACEUTICAL: Performed by: NURSE PRACTITIONER

## 2025-08-13 PROCEDURE — 6360000004 HC RX CONTRAST MEDICATION: Performed by: INTERNAL MEDICINE

## 2025-08-13 PROCEDURE — 78306 BONE IMAGING WHOLE BODY: CPT | Performed by: NURSE PRACTITIONER

## 2025-08-13 RX ORDER — DIATRIZOATE MEGLUMINE AND DIATRIZOATE SODIUM 660; 100 MG/ML; MG/ML
12 SOLUTION ORAL; RECTAL
Status: DISCONTINUED | OUTPATIENT
Start: 2025-08-13 | End: 2025-08-14 | Stop reason: HOSPADM

## 2025-08-13 RX ORDER — IOPAMIDOL 755 MG/ML
75 INJECTION, SOLUTION INTRAVASCULAR
Status: COMPLETED | OUTPATIENT
Start: 2025-08-13 | End: 2025-08-13

## 2025-08-13 RX ORDER — TC 99M MEDRONATE 20 MG/10ML
27 INJECTION, POWDER, LYOPHILIZED, FOR SOLUTION INTRAVENOUS
Status: DISCONTINUED | OUTPATIENT
Start: 2025-08-13 | End: 2025-08-14 | Stop reason: HOSPADM

## 2025-08-13 RX ADMIN — DIATRIZOATE MEGLUMINE AND DIATRIZOATE SODIUM 12 ML: 660; 100 LIQUID ORAL; RECTAL at 08:36

## 2025-08-13 RX ADMIN — IOPAMIDOL 75 ML: 755 INJECTION, SOLUTION INTRAVENOUS at 08:36

## 2025-08-13 RX ADMIN — TC 99M MEDRONATE 27 MILLICURIE: 20 INJECTION, POWDER, LYOPHILIZED, FOR SOLUTION INTRAVENOUS at 10:00

## (undated) DEVICE — SOLUTION IV 1000ML 0.9% SOD CHL

## (undated) DEVICE — SPONGE GZ W4XL8IN COT WVN 12 PLY

## (undated) DEVICE — 3M™ STERI-STRIP™ COMPOUND BENZOIN TINCTURE 40 BAGS/CARTON 4 CARTONS/CASE C1544: Brand: 3M™ STERI-STRIP™

## (undated) DEVICE — SUTURE COAT VCRL SZ 4-0 L18IN ABSRB UD L19MM PS-2 1/2 CIR J496G

## (undated) DEVICE — JEWISH HOSPITAL TURNOVER KIT: Brand: MEDLINE INDUSTRIES, INC.

## (undated) DEVICE — SUTURE PERMA-HAND SZ 2-0 L30IN NONABSORBABLE BLK L30MM FSL 679H

## (undated) DEVICE — BLANKET WRM W40.2XL55.9IN IORT LO BODY + MISTRAL AIR

## (undated) DEVICE — TRAY PREP DRY W/ PREM GLV 2 APPL 6 SPNG 2 UNDPD 1 OVERWRAP

## (undated) DEVICE — GARMENT,MEDLINE,DVT,INT,CALF,MED, GEN2: Brand: MEDLINE

## (undated) DEVICE — STANDARD HYPODERMIC NEEDLE,POLYPROPYLENE HUB: Brand: MONOJECT

## (undated) DEVICE — STRIP,CLOSURE,WOUND,MEDI-STRIP,1/2X4: Brand: MEDLINE

## (undated) DEVICE — ELECTROSURGICAL PENCIL ROCKER SWITCH NON COATED BLADE ELECTRODE 10 FT (3 M) CORD HOLSTER: Brand: MEGADYNE

## (undated) DEVICE — TRAY CATHETER 16FR F INCLUDE BARDX IC COMPLT CARE DRNGE BG

## (undated) DEVICE — COVER LT HNDL BLU PLAS

## (undated) DEVICE — DRAIN WND SIL FLAT RADIOPAQUE 10MM FULL FLUTED

## (undated) DEVICE — GLOVE SURG SZ 7 L11.2IN THK9.8MIL STRW LTX POLYMER BEAD CUF

## (undated) DEVICE — PLATE ES AD W 9FT CRD 2

## (undated) DEVICE — INTENDED FOR TISSUE SEPARATION, AND OTHER PROCEDURES THAT REQUIRE A SHARP SURGICAL BLADE TO PUNCTURE OR CUT.: Brand: BARD-PARKER ® CARBON RIB-BACK BLADES

## (undated) DEVICE — SUTURE VCRL SZ 3-0 L27IN ABSRB UD L26MM SH 1/2 CIR J416H

## (undated) DEVICE — Z INACTIVE USE 2641837 CLIP LIG M BLU TI HRT SHP WIRE HORZ 600 PER BX

## (undated) DEVICE — SPONGE,LAP,18"X18",DLX,XR,ST,5/PK,40/PK: Brand: MEDLINE

## (undated) DEVICE — SURE SET-DOUBLE BASIN-LF: Brand: MEDLINE INDUSTRIES, INC.

## (undated) DEVICE — RESERVOIR,SUCTION,100CC,SILICONE: Brand: MEDLINE

## (undated) DEVICE — SURGICAL SET UP - SURE SET: Brand: MEDLINE INDUSTRIES, INC.

## (undated) DEVICE — ROYAL SILK SURGICAL GOWN, XL: Brand: CONVERTORS

## (undated) DEVICE — DRAPE,CHEST,FENES,15X10,STERIL: Brand: MEDLINE